# Patient Record
Sex: FEMALE | Race: ASIAN | NOT HISPANIC OR LATINO
[De-identification: names, ages, dates, MRNs, and addresses within clinical notes are randomized per-mention and may not be internally consistent; named-entity substitution may affect disease eponyms.]

---

## 2017-03-17 ENCOUNTER — APPOINTMENT (OUTPATIENT)
Dept: UROLOGY | Facility: CLINIC | Age: 67
End: 2017-03-17

## 2017-03-17 ENCOUNTER — OUTPATIENT (OUTPATIENT)
Dept: OUTPATIENT SERVICES | Facility: HOSPITAL | Age: 67
LOS: 1 days | End: 2017-03-17
Payer: MEDICAID

## 2017-03-17 DIAGNOSIS — E11.8 TYPE 2 DIABETES MELLITUS WITH UNSPECIFIED COMPLICATIONS: ICD-10-CM

## 2017-03-17 DIAGNOSIS — E11.9 TYPE 2 DIABETES MELLITUS W/OUT COMPLICATIONS: ICD-10-CM

## 2017-03-17 DIAGNOSIS — R35.0 FREQUENCY OF MICTURITION: ICD-10-CM

## 2017-03-17 DIAGNOSIS — Z98.89 OTHER SPECIFIED POSTPROCEDURAL STATES: Chronic | ICD-10-CM

## 2017-03-17 DIAGNOSIS — Z79.4 TYPE 2 DIABETES MELLITUS WITH UNSPECIFIED COMPLICATIONS: ICD-10-CM

## 2017-03-17 DIAGNOSIS — Z90.710 ACQUIRED ABSENCE OF BOTH CERVIX AND UTERUS: Chronic | ICD-10-CM

## 2017-03-17 LAB
BILIRUB UR QL STRIP: NORMAL
CLARITY UR: CLEAR
COLLECTION METHOD: NORMAL
GLUCOSE UR-MCNC: NORMAL
HCG UR QL: 0.2 EU/DL
HGB UR QL STRIP.AUTO: NORMAL
KETONES UR-MCNC: NORMAL
LEUKOCYTE ESTERASE UR QL STRIP: NORMAL
NITRITE UR QL STRIP: NORMAL
PH UR STRIP: 6
PROT UR STRIP-MCNC: NORMAL
SP GR UR STRIP: 1.01

## 2017-03-17 PROCEDURE — 51797 INTRAABDOMINAL PRESSURE TEST: CPT

## 2017-03-17 PROCEDURE — 51728 CYSTOMETROGRAM W/VP: CPT

## 2017-03-17 PROCEDURE — 51784 ANAL/URINARY MUSCLE STUDY: CPT

## 2017-03-17 PROCEDURE — 51741 ELECTRO-UROFLOWMETRY FIRST: CPT

## 2017-03-22 DIAGNOSIS — N31.9 NEUROMUSCULAR DYSFUNCTION OF BLADDER, UNSPECIFIED: ICD-10-CM

## 2017-03-22 DIAGNOSIS — N39.41 URGE INCONTINENCE: ICD-10-CM

## 2017-03-22 DIAGNOSIS — E11.9 TYPE 2 DIABETES MELLITUS WITHOUT COMPLICATIONS: ICD-10-CM

## 2017-03-22 DIAGNOSIS — E11.8 TYPE 2 DIABETES MELLITUS WITH UNSPECIFIED COMPLICATIONS: ICD-10-CM

## 2017-04-21 ENCOUNTER — APPOINTMENT (OUTPATIENT)
Dept: UROLOGY | Facility: CLINIC | Age: 67
End: 2017-04-21

## 2017-04-21 VITALS
DIASTOLIC BLOOD PRESSURE: 83 MMHG | TEMPERATURE: 98.2 F | RESPIRATION RATE: 16 BRPM | HEART RATE: 92 BPM | SYSTOLIC BLOOD PRESSURE: 117 MMHG

## 2017-05-15 ENCOUNTER — RX RENEWAL (OUTPATIENT)
Age: 67
End: 2017-05-15

## 2017-05-15 RX ORDER — OXYBUTYNIN CHLORIDE 5 MG/1
5 TABLET, EXTENDED RELEASE ORAL
Qty: 30 | Refills: 1 | Status: DISCONTINUED | COMMUNITY
Start: 2017-05-15 | End: 2017-05-15

## 2017-07-28 ENCOUNTER — APPOINTMENT (OUTPATIENT)
Dept: UROLOGY | Facility: CLINIC | Age: 67
End: 2017-07-28
Payer: MEDICAID

## 2017-07-28 VITALS
SYSTOLIC BLOOD PRESSURE: 99 MMHG | DIASTOLIC BLOOD PRESSURE: 65 MMHG | HEART RATE: 91 BPM | RESPIRATION RATE: 16 BRPM | TEMPERATURE: 98.4 F

## 2017-07-28 DIAGNOSIS — R33.9 RETENTION OF URINE, UNSPECIFIED: ICD-10-CM

## 2017-07-28 DIAGNOSIS — N31.9 NEUROMUSCULAR DYSFUNCTION OF BLADDER, UNSPECIFIED: ICD-10-CM

## 2017-07-28 PROCEDURE — 99213 OFFICE O/P EST LOW 20 MIN: CPT

## 2017-07-28 RX ORDER — ZOLPIDEM TARTRATE 10 MG/1
10 TABLET ORAL
Qty: 30 | Refills: 0 | Status: ACTIVE | COMMUNITY
Start: 2017-03-07

## 2017-07-28 RX ORDER — BLOOD SUGAR DIAGNOSTIC
STRIP MISCELLANEOUS
Qty: 100 | Refills: 0 | Status: ACTIVE | COMMUNITY
Start: 2017-04-21

## 2017-07-28 RX ORDER — NAPROXEN 500 MG/1
500 TABLET, DELAYED RELEASE ORAL
Qty: 60 | Refills: 0 | Status: ACTIVE | COMMUNITY
Start: 2017-03-06

## 2017-07-28 RX ORDER — LIRAGLUTIDE 6 MG/ML
18 INJECTION SUBCUTANEOUS
Qty: 9 | Refills: 0 | Status: ACTIVE | COMMUNITY
Start: 2017-03-13

## 2017-07-28 RX ORDER — PEN NEEDLE, DIABETIC 29 G X1/2"
32G X 4 MM NEEDLE, DISPOSABLE MISCELLANEOUS
Qty: 100 | Refills: 0 | Status: ACTIVE | COMMUNITY
Start: 2017-03-14

## 2017-08-15 ENCOUNTER — RX RENEWAL (OUTPATIENT)
Age: 67
End: 2017-08-15

## 2017-09-29 ENCOUNTER — APPOINTMENT (OUTPATIENT)
Dept: UROLOGY | Facility: CLINIC | Age: 67
End: 2017-09-29
Payer: MEDICAID

## 2017-09-29 VITALS — DIASTOLIC BLOOD PRESSURE: 71 MMHG | RESPIRATION RATE: 16 BRPM | SYSTOLIC BLOOD PRESSURE: 107 MMHG | HEART RATE: 83 BPM

## 2017-09-29 DIAGNOSIS — N39.0 URINARY TRACT INFECTION, SITE NOT SPECIFIED: ICD-10-CM

## 2017-09-29 DIAGNOSIS — N39.41 URGE INCONTINENCE: ICD-10-CM

## 2017-09-29 DIAGNOSIS — R10.2 PELVIC AND PERINEAL PAIN: ICD-10-CM

## 2017-09-29 PROCEDURE — 99213 OFFICE O/P EST LOW 20 MIN: CPT

## 2017-09-29 RX ORDER — AMOXICILLIN AND CLAVULANATE POTASSIUM 875; 125 MG/1; MG/1
875-125 TABLET, COATED ORAL TWICE DAILY
Qty: 20 | Refills: 0 | Status: ACTIVE | COMMUNITY
Start: 2017-09-29 | End: 1900-01-01

## 2017-10-09 ENCOUNTER — APPOINTMENT (OUTPATIENT)
Age: 67
End: 2017-10-09

## 2017-10-09 LAB
APPEARANCE: CLEAR
BACTERIA UR CULT: NORMAL
BILIRUBIN URINE: NEGATIVE
BLOOD URINE: NEGATIVE
COLOR: YELLOW
GLUCOSE QUALITATIVE U: NORMAL MG/DL
KETONES URINE: NEGATIVE
LEUKOCYTE ESTERASE URINE: NEGATIVE
NITRITE URINE: NEGATIVE
PH URINE: 6
PROTEIN URINE: NEGATIVE MG/DL
SPECIFIC GRAVITY URINE: 1.02
UROBILINOGEN URINE: NORMAL MG/DL

## 2017-12-08 ENCOUNTER — APPOINTMENT (OUTPATIENT)
Dept: UROLOGY | Facility: CLINIC | Age: 67
End: 2017-12-08

## 2017-12-19 ENCOUNTER — RX RENEWAL (OUTPATIENT)
Age: 67
End: 2017-12-19

## 2018-02-15 ENCOUNTER — RX RENEWAL (OUTPATIENT)
Age: 68
End: 2018-02-15

## 2018-02-15 RX ORDER — OXYBUTYNIN CHLORIDE 10 MG/1
10 TABLET, EXTENDED RELEASE ORAL
Qty: 30 | Refills: 2 | Status: ACTIVE | COMMUNITY
Start: 2017-03-17 | End: 1900-01-01

## 2018-06-01 ENCOUNTER — OUTPATIENT (OUTPATIENT)
Dept: OUTPATIENT SERVICES | Facility: HOSPITAL | Age: 68
LOS: 1 days | End: 2018-06-01
Payer: MEDICAID

## 2018-06-01 DIAGNOSIS — Z90.710 ACQUIRED ABSENCE OF BOTH CERVIX AND UTERUS: Chronic | ICD-10-CM

## 2018-06-01 DIAGNOSIS — Z98.89 OTHER SPECIFIED POSTPROCEDURAL STATES: Chronic | ICD-10-CM

## 2018-06-01 PROCEDURE — G9001: CPT

## 2018-06-24 ENCOUNTER — INPATIENT (INPATIENT)
Facility: HOSPITAL | Age: 68
LOS: 3 days | End: 2018-06-28
Attending: STUDENT IN AN ORGANIZED HEALTH CARE EDUCATION/TRAINING PROGRAM | Admitting: STUDENT IN AN ORGANIZED HEALTH CARE EDUCATION/TRAINING PROGRAM
Payer: SUBSIDIZED

## 2018-06-24 VITALS
SYSTOLIC BLOOD PRESSURE: 160 MMHG | HEART RATE: 111 BPM | OXYGEN SATURATION: 100 % | DIASTOLIC BLOOD PRESSURE: 101 MMHG | TEMPERATURE: 102 F | RESPIRATION RATE: 20 BRPM

## 2018-06-24 DIAGNOSIS — Z90.710 ACQUIRED ABSENCE OF BOTH CERVIX AND UTERUS: Chronic | ICD-10-CM

## 2018-06-24 DIAGNOSIS — Z98.89 OTHER SPECIFIED POSTPROCEDURAL STATES: Chronic | ICD-10-CM

## 2018-06-24 DIAGNOSIS — A41.9 SEPSIS, UNSPECIFIED ORGANISM: ICD-10-CM

## 2018-06-24 LAB
APPEARANCE UR: CLEAR — SIGNIFICANT CHANGE UP
BACTERIA # UR AUTO: SIGNIFICANT CHANGE UP
BILIRUB UR-MCNC: NEGATIVE — SIGNIFICANT CHANGE UP
BLOOD UR QL VISUAL: HIGH
COLOR SPEC: SIGNIFICANT CHANGE UP
GLUCOSE UR-MCNC: 250 — SIGNIFICANT CHANGE UP
KETONES UR-MCNC: NEGATIVE — SIGNIFICANT CHANGE UP
LEUKOCYTE ESTERASE UR-ACNC: NEGATIVE — SIGNIFICANT CHANGE UP
MUCOUS THREADS # UR AUTO: SIGNIFICANT CHANGE UP
NITRITE UR-MCNC: NEGATIVE — SIGNIFICANT CHANGE UP
PH UR: 7 — SIGNIFICANT CHANGE UP (ref 4.6–8)
PROT UR-MCNC: 150 MG/DL — HIGH
RBC CASTS # UR COMP ASSIST: SIGNIFICANT CHANGE UP (ref 0–?)
SP GR SPEC: 1.01 — SIGNIFICANT CHANGE UP (ref 1–1.04)
SQUAMOUS # UR AUTO: SIGNIFICANT CHANGE UP
UROBILINOGEN FLD QL: NORMAL MG/DL — SIGNIFICANT CHANGE UP
WBC UR QL: SIGNIFICANT CHANGE UP (ref 0–?)

## 2018-06-24 PROCEDURE — 71275 CT ANGIOGRAPHY CHEST: CPT | Mod: 26

## 2018-06-24 PROCEDURE — 70450 CT HEAD/BRAIN W/O DYE: CPT | Mod: 26

## 2018-06-24 PROCEDURE — 71045 X-RAY EXAM CHEST 1 VIEW: CPT | Mod: 26

## 2018-06-24 RX ORDER — PIPERACILLIN AND TAZOBACTAM 4; .5 G/20ML; G/20ML
3.38 INJECTION, POWDER, LYOPHILIZED, FOR SOLUTION INTRAVENOUS ONCE
Qty: 0 | Refills: 0 | Status: COMPLETED | OUTPATIENT
Start: 2018-06-24 | End: 2018-06-24

## 2018-06-24 RX ORDER — VANCOMYCIN HCL 1 G
1000 VIAL (EA) INTRAVENOUS ONCE
Qty: 0 | Refills: 0 | Status: COMPLETED | OUTPATIENT
Start: 2018-06-24 | End: 2018-06-25

## 2018-06-24 RX ORDER — SODIUM CHLORIDE 9 MG/ML
1000 INJECTION INTRAMUSCULAR; INTRAVENOUS; SUBCUTANEOUS ONCE
Qty: 0 | Refills: 0 | Status: COMPLETED | OUTPATIENT
Start: 2018-06-24 | End: 2018-06-24

## 2018-06-24 RX ORDER — ACETAMINOPHEN 500 MG
1000 TABLET ORAL ONCE
Qty: 0 | Refills: 0 | Status: COMPLETED | OUTPATIENT
Start: 2018-06-24 | End: 2018-06-24

## 2018-06-24 RX ADMIN — SODIUM CHLORIDE 1000 MILLILITER(S): 9 INJECTION INTRAMUSCULAR; INTRAVENOUS; SUBCUTANEOUS at 22:52

## 2018-06-24 RX ADMIN — PIPERACILLIN AND TAZOBACTAM 200 GRAM(S): 4; .5 INJECTION, POWDER, LYOPHILIZED, FOR SOLUTION INTRAVENOUS at 22:15

## 2018-06-24 RX ADMIN — Medication 400 MILLIGRAM(S): at 22:15

## 2018-06-24 NOTE — ED PROVIDER NOTE - OBJECTIVE STATEMENT
67F with hx of DM2 p/w witnessed arrest. Patient BIBEMS after ROSC obtained. Patient was noted to just stop breathing at home, EMS called and arrived 3-4 mins later found to be in PEA and CPR initiated. Received epi x1 and rosc obtained. Patient intubated at scene and brought to ED. Family provides more hx that she has had a URI this week and started cipro for presumed UTI.

## 2018-06-24 NOTE — ED ADULT TRIAGE NOTE - CHIEF COMPLAINT QUOTE
pt. arrived as a Notification to [facility]. Pt. arrives as a post cardiac arrest , as per EMS pt. was intubated at the scene and bp 70/50.

## 2018-06-24 NOTE — ED PROVIDER NOTE - MEDICAL DECISION MAKING DETAILS
67F with witnessed cardiac arrest s/p rosc, intubated, remains unresponsive. febrile, sepsis w/u, abs, ivf, tylenol. MICU eval

## 2018-06-24 NOTE — ED PROVIDER NOTE - PMH
DM (diabetes mellitus)    HTN (hypertension)    Morbid obesity, unspecified obesity type    Uterine prolapse

## 2018-06-24 NOTE — ED PROVIDER NOTE - ATTENDING CONTRIBUTION TO CARE
Attending note:   After face to face evaluation of this patient, I concur with above noted hx, pe, and care plan for this patient. 66 y/o F, morbid obesity, recent uti, on cipro here after full cardiac arrest at home with cpr by family and by ems with intubation, cpr and epi by ems with return of spontaneous respiration.   In ED, bp 180/systolic with pox 100 with ventilation.    T102; sepsis work-up in progress

## 2018-06-24 NOTE — ED ADULT NURSE NOTE - OBJECTIVE STATEMENT
Ailyn RN: Patient arrives to ED as post cardiac arrest from home, intubated ett # 8 , 25 at [facility].  Ems states cpr was initiated immediately after patient was found and 1 round of epi received at 2055. Rosc achieved at 2058 Patient has IO in place from EMS to right Tibia infusing with NS. Patient found to be febrile in exam room and sepsis protocol intiated at 2200, blood work sent and IV fluids/ meds initiated. Family reports patient recently being treated for uti. Family unsure of medical hx, patient is morbidly obese and self catheterizes at home. v/s stable currently. 16 fr monroy placed with 200ml output initially. Urine appears clear, and yellow. Will continue to monitor, currently on zole and ED cardiac monitor. Vent settingsrr-16, volume 500, O2 100%, PEEP 5.

## 2018-06-25 LAB
ALBUMIN SERPL ELPH-MCNC: 3.2 G/DL — LOW (ref 3.3–5)
ALBUMIN SERPL ELPH-MCNC: 3.3 G/DL — SIGNIFICANT CHANGE UP (ref 3.3–5)
ALP SERPL-CCNC: 229 U/L — HIGH (ref 40–120)
ALP SERPL-CCNC: 245 U/L — HIGH (ref 40–120)
ALT FLD-CCNC: 44 U/L — HIGH (ref 4–33)
ALT FLD-CCNC: 44 U/L — HIGH (ref 4–33)
ANISOCYTOSIS BLD QL: SIGNIFICANT CHANGE UP
AST SERPL-CCNC: 55 U/L — HIGH (ref 4–32)
AST SERPL-CCNC: 67 U/L — HIGH (ref 4–32)
B PERT DNA SPEC QL NAA+PROBE: SIGNIFICANT CHANGE UP
BASE EXCESS BLDA CALC-SCNC: -0.7 MMOL/L — SIGNIFICANT CHANGE UP
BASE EXCESS BLDA CALC-SCNC: -4.2 MMOL/L — SIGNIFICANT CHANGE UP
BASO STIPL BLD QL SMEAR: PRESENT — SIGNIFICANT CHANGE UP
BASOPHILS # BLD AUTO: 0.01 K/UL — SIGNIFICANT CHANGE UP (ref 0–0.2)
BASOPHILS # BLD AUTO: 0.03 K/UL — SIGNIFICANT CHANGE UP (ref 0–0.2)
BASOPHILS NFR BLD AUTO: 0.1 % — SIGNIFICANT CHANGE UP (ref 0–2)
BASOPHILS NFR BLD AUTO: 0.1 % — SIGNIFICANT CHANGE UP (ref 0–2)
BASOPHILS NFR SPEC: 0 % — SIGNIFICANT CHANGE UP (ref 0–2)
BILIRUB SERPL-MCNC: 0.5 MG/DL — SIGNIFICANT CHANGE UP (ref 0.2–1.2)
BILIRUB SERPL-MCNC: 0.7 MG/DL — SIGNIFICANT CHANGE UP (ref 0.2–1.2)
BUN SERPL-MCNC: 24 MG/DL — HIGH (ref 7–23)
BUN SERPL-MCNC: 25 MG/DL — HIGH (ref 7–23)
BURR CELLS BLD QL SMEAR: PRESENT — SIGNIFICANT CHANGE UP
C PNEUM DNA SPEC QL NAA+PROBE: NOT DETECTED — SIGNIFICANT CHANGE UP
CALCIUM SERPL-MCNC: 7.6 MG/DL — LOW (ref 8.4–10.5)
CALCIUM SERPL-MCNC: 8.2 MG/DL — LOW (ref 8.4–10.5)
CHLORIDE BLDA-SCNC: 106 MMOL/L — SIGNIFICANT CHANGE UP (ref 96–108)
CHLORIDE SERPL-SCNC: 101 MMOL/L — SIGNIFICANT CHANGE UP (ref 98–107)
CHLORIDE SERPL-SCNC: 99 MMOL/L — SIGNIFICANT CHANGE UP (ref 98–107)
CK MB BLD-MCNC: 2 — SIGNIFICANT CHANGE UP (ref 0–2.5)
CK MB BLD-MCNC: 2.2 — SIGNIFICANT CHANGE UP (ref 0–2.5)
CK MB BLD-MCNC: 3.4 NG/ML — SIGNIFICANT CHANGE UP (ref 1–4.7)
CK MB BLD-MCNC: 3.74 NG/ML — SIGNIFICANT CHANGE UP (ref 1–4.7)
CK SERPL-CCNC: 167 U/L — SIGNIFICANT CHANGE UP (ref 25–170)
CK SERPL-CCNC: 174 U/L — HIGH (ref 25–170)
CO2 SERPL-SCNC: 21 MMOL/L — LOW (ref 22–31)
CO2 SERPL-SCNC: 21 MMOL/L — LOW (ref 22–31)
CREAT SERPL-MCNC: 1.32 MG/DL — HIGH (ref 0.5–1.3)
CREAT SERPL-MCNC: 1.38 MG/DL — HIGH (ref 0.5–1.3)
EOSINOPHIL # BLD AUTO: 0 K/UL — SIGNIFICANT CHANGE UP (ref 0–0.5)
EOSINOPHIL # BLD AUTO: 0.01 K/UL — SIGNIFICANT CHANGE UP (ref 0–0.5)
EOSINOPHIL NFR BLD AUTO: 0 % — SIGNIFICANT CHANGE UP (ref 0–6)
EOSINOPHIL NFR BLD AUTO: 0 % — SIGNIFICANT CHANGE UP (ref 0–6)
EOSINOPHIL NFR FLD: 0 % — SIGNIFICANT CHANGE UP (ref 0–6)
FLUAV H1 2009 PAND RNA SPEC QL NAA+PROBE: NOT DETECTED — SIGNIFICANT CHANGE UP
FLUAV H1 RNA SPEC QL NAA+PROBE: NOT DETECTED — SIGNIFICANT CHANGE UP
FLUAV H3 RNA SPEC QL NAA+PROBE: NOT DETECTED — SIGNIFICANT CHANGE UP
FLUAV SUBTYP SPEC NAA+PROBE: SIGNIFICANT CHANGE UP
FLUBV RNA SPEC QL NAA+PROBE: NOT DETECTED — SIGNIFICANT CHANGE UP
GIANT PLATELETS BLD QL SMEAR: PRESENT — SIGNIFICANT CHANGE UP
GLUCOSE BLDA-MCNC: 262 MG/DL — HIGH (ref 70–99)
GLUCOSE BLDC GLUCOMTR-MCNC: 156 MG/DL — HIGH (ref 70–99)
GLUCOSE BLDC GLUCOMTR-MCNC: 156 MG/DL — HIGH (ref 70–99)
GLUCOSE BLDC GLUCOMTR-MCNC: 180 MG/DL — HIGH (ref 70–99)
GLUCOSE BLDC GLUCOMTR-MCNC: 216 MG/DL — HIGH (ref 70–99)
GLUCOSE SERPL-MCNC: 224 MG/DL — HIGH (ref 70–99)
GLUCOSE SERPL-MCNC: 252 MG/DL — HIGH (ref 70–99)
GRAM STN SPT: SIGNIFICANT CHANGE UP
HADV DNA SPEC QL NAA+PROBE: NOT DETECTED — SIGNIFICANT CHANGE UP
HBA1C BLD-MCNC: 6 % — HIGH (ref 4–5.6)
HCO3 BLDA-SCNC: 21 MMOL/L — LOW (ref 22–26)
HCO3 BLDA-SCNC: 23 MMOL/L — SIGNIFICANT CHANGE UP (ref 22–26)
HCOV 229E RNA SPEC QL NAA+PROBE: NOT DETECTED — SIGNIFICANT CHANGE UP
HCOV HKU1 RNA SPEC QL NAA+PROBE: NOT DETECTED — SIGNIFICANT CHANGE UP
HCOV NL63 RNA SPEC QL NAA+PROBE: NOT DETECTED — SIGNIFICANT CHANGE UP
HCOV OC43 RNA SPEC QL NAA+PROBE: NOT DETECTED — SIGNIFICANT CHANGE UP
HCT VFR BLD CALC: 34.3 % — LOW (ref 34.5–45)
HCT VFR BLD CALC: 35 % — SIGNIFICANT CHANGE UP (ref 34.5–45)
HCT VFR BLDA CALC: 30.4 % — LOW (ref 34.5–46.5)
HGB BLD-MCNC: 10 G/DL — LOW (ref 11.5–15.5)
HGB BLD-MCNC: 9.6 G/DL — LOW (ref 11.5–15.5)
HGB BLDA-MCNC: 9.8 G/DL — LOW (ref 11.5–15.5)
HMPV RNA SPEC QL NAA+PROBE: NOT DETECTED — SIGNIFICANT CHANGE UP
HPIV1 RNA SPEC QL NAA+PROBE: NOT DETECTED — SIGNIFICANT CHANGE UP
HPIV2 RNA SPEC QL NAA+PROBE: NOT DETECTED — SIGNIFICANT CHANGE UP
HPIV3 RNA SPEC QL NAA+PROBE: NOT DETECTED — SIGNIFICANT CHANGE UP
HPIV4 RNA SPEC QL NAA+PROBE: NOT DETECTED — SIGNIFICANT CHANGE UP
IMM GRANULOCYTES # BLD AUTO: 0.13 # — SIGNIFICANT CHANGE UP
IMM GRANULOCYTES # BLD AUTO: 0.19 # — SIGNIFICANT CHANGE UP
IMM GRANULOCYTES NFR BLD AUTO: 0.7 % — SIGNIFICANT CHANGE UP (ref 0–1.5)
IMM GRANULOCYTES NFR BLD AUTO: 0.8 % — SIGNIFICANT CHANGE UP (ref 0–1.5)
LACTATE BLDA-SCNC: 1.6 MMOL/L — SIGNIFICANT CHANGE UP (ref 0.5–2)
LYMPHOCYTES # BLD AUTO: 0.51 K/UL — LOW (ref 1–3.3)
LYMPHOCYTES # BLD AUTO: 0.69 K/UL — LOW (ref 1–3.3)
LYMPHOCYTES # BLD AUTO: 2.2 % — LOW (ref 13–44)
LYMPHOCYTES # BLD AUTO: 3.5 % — LOW (ref 13–44)
LYMPHOCYTES NFR SPEC AUTO: 0.9 % — LOW (ref 13–44)
M PNEUMO DNA SPEC QL NAA+PROBE: NOT DETECTED — SIGNIFICANT CHANGE UP
MAGNESIUM SERPL-MCNC: 2.3 MG/DL — SIGNIFICANT CHANGE UP (ref 1.6–2.6)
MAGNESIUM SERPL-MCNC: 2.4 MG/DL — SIGNIFICANT CHANGE UP (ref 1.6–2.6)
MCHC RBC-ENTMCNC: 17.6 PG — LOW (ref 27–34)
MCHC RBC-ENTMCNC: 17.7 PG — LOW (ref 27–34)
MCHC RBC-ENTMCNC: 27.4 % — LOW (ref 32–36)
MCHC RBC-ENTMCNC: 29.2 % — LOW (ref 32–36)
MCV RBC AUTO: 60.4 FL — LOW (ref 80–100)
MCV RBC AUTO: 64.6 FL — LOW (ref 80–100)
METHOD TYPE: SIGNIFICANT CHANGE UP
MICROCYTES BLD QL: SIGNIFICANT CHANGE UP
MONOCYTES # BLD AUTO: 0.82 K/UL — SIGNIFICANT CHANGE UP (ref 0–0.9)
MONOCYTES # BLD AUTO: 1.95 K/UL — HIGH (ref 0–0.9)
MONOCYTES NFR BLD AUTO: 4.2 % — SIGNIFICANT CHANGE UP (ref 2–14)
MONOCYTES NFR BLD AUTO: 8.5 % — SIGNIFICANT CHANGE UP (ref 2–14)
MONOCYTES NFR BLD: 4.4 % — SIGNIFICANT CHANGE UP (ref 2–9)
NEUTROPHIL AB SER-ACNC: 89.4 % — HIGH (ref 43–77)
NEUTROPHILS # BLD AUTO: 18 K/UL — HIGH (ref 1.8–7.4)
NEUTROPHILS # BLD AUTO: 20.16 K/UL — HIGH (ref 1.8–7.4)
NEUTROPHILS NFR BLD AUTO: 88.4 % — HIGH (ref 43–77)
NEUTROPHILS NFR BLD AUTO: 91.5 % — HIGH (ref 43–77)
NEUTS BAND # BLD: 5.3 % — SIGNIFICANT CHANGE UP (ref 0–6)
NRBC # FLD: 0 — SIGNIFICANT CHANGE UP
NRBC # FLD: 0 — SIGNIFICANT CHANGE UP
ORGANISM # SPEC MICROSCOPIC CNT: SIGNIFICANT CHANGE UP
ORGANISM # SPEC MICROSCOPIC CNT: SIGNIFICANT CHANGE UP
OVALOCYTES BLD QL SMEAR: SLIGHT — SIGNIFICANT CHANGE UP
PCO2 BLDA: 47 MMHG — SIGNIFICANT CHANGE UP (ref 32–48)
PCO2 BLDA: 49 MMHG — HIGH (ref 32–48)
PH BLDA: 7.28 PH — LOW (ref 7.35–7.45)
PH BLDA: 7.32 PH — LOW (ref 7.35–7.45)
PHOSPHATE SERPL-MCNC: 2.1 MG/DL — LOW (ref 2.5–4.5)
PHOSPHATE SERPL-MCNC: 3.3 MG/DL — SIGNIFICANT CHANGE UP (ref 2.5–4.5)
PLATELET # BLD AUTO: 257 K/UL — SIGNIFICANT CHANGE UP (ref 150–400)
PLATELET # BLD AUTO: 266 K/UL — SIGNIFICANT CHANGE UP (ref 150–400)
PLATELET COUNT - ESTIMATE: NORMAL — SIGNIFICANT CHANGE UP
PMV BLD: SIGNIFICANT CHANGE UP FL (ref 7–13)
PMV BLD: SIGNIFICANT CHANGE UP FL (ref 7–13)
PO2 BLDA: 109 MMHG — HIGH (ref 83–108)
PO2 BLDA: 38 MMHG — CRITICAL LOW (ref 83–108)
POIKILOCYTOSIS BLD QL AUTO: SIGNIFICANT CHANGE UP
POLYCHROMASIA BLD QL SMEAR: SLIGHT — SIGNIFICANT CHANGE UP
POTASSIUM BLDA-SCNC: 4.4 MMOL/L — SIGNIFICANT CHANGE UP (ref 3.4–4.5)
POTASSIUM SERPL-MCNC: 4.6 MMOL/L — SIGNIFICANT CHANGE UP (ref 3.5–5.3)
POTASSIUM SERPL-MCNC: 4.8 MMOL/L — SIGNIFICANT CHANGE UP (ref 3.5–5.3)
POTASSIUM SERPL-SCNC: 4.6 MMOL/L — SIGNIFICANT CHANGE UP (ref 3.5–5.3)
POTASSIUM SERPL-SCNC: 4.8 MMOL/L — SIGNIFICANT CHANGE UP (ref 3.5–5.3)
PROT SERPL-MCNC: 6.3 G/DL — SIGNIFICANT CHANGE UP (ref 6–8.3)
PROT SERPL-MCNC: 6.6 G/DL — SIGNIFICANT CHANGE UP (ref 6–8.3)
RBC # BLD: 5.42 M/UL — HIGH (ref 3.8–5.2)
RBC # BLD: 5.68 M/UL — HIGH (ref 3.8–5.2)
RBC # FLD: 23.2 % — HIGH (ref 10.3–14.5)
RBC # FLD: 23.5 % — HIGH (ref 10.3–14.5)
RSV RNA SPEC QL NAA+PROBE: NOT DETECTED — SIGNIFICANT CHANGE UP
RV+EV RNA SPEC QL NAA+PROBE: NOT DETECTED — SIGNIFICANT CHANGE UP
SAO2 % BLDA: 70.7 % — LOW (ref 95–99)
SAO2 % BLDA: 96.8 % — SIGNIFICANT CHANGE UP (ref 95–99)
SCHISTOCYTES BLD QL AUTO: SLIGHT — SIGNIFICANT CHANGE UP
SODIUM BLDA-SCNC: 134 MMOL/L — LOW (ref 136–146)
SODIUM SERPL-SCNC: 135 MMOL/L — SIGNIFICANT CHANGE UP (ref 135–145)
SODIUM SERPL-SCNC: 136 MMOL/L — SIGNIFICANT CHANGE UP (ref 135–145)
SPECIMEN SOURCE: SIGNIFICANT CHANGE UP
T3 SERPL-MCNC: 73.9 NG/DL — LOW (ref 80–200)
T4 AB SER-ACNC: 7.76 UG/DL — SIGNIFICANT CHANGE UP (ref 5.1–13)
TARGETS BLD QL SMEAR: SLIGHT — SIGNIFICANT CHANGE UP
TROPONIN T, HIGH SENSITIVITY: 129 NG/L — CRITICAL HIGH (ref ?–14)
TROPONIN T, HIGH SENSITIVITY: 173 NG/L — CRITICAL HIGH (ref ?–14)
TROPONIN T, HIGH SENSITIVITY: 176 NG/L — CRITICAL HIGH (ref ?–14)
TSH SERPL-MCNC: 0.46 UIU/ML — SIGNIFICANT CHANGE UP (ref 0.27–4.2)
WBC # BLD: 19.65 K/UL — HIGH (ref 3.8–10.5)
WBC # BLD: 22.85 K/UL — HIGH (ref 3.8–10.5)
WBC # FLD AUTO: 19.65 K/UL — HIGH (ref 3.8–10.5)
WBC # FLD AUTO: 22.85 K/UL — HIGH (ref 3.8–10.5)

## 2018-06-25 PROCEDURE — 95819 EEG AWAKE AND ASLEEP: CPT | Mod: 26

## 2018-06-25 PROCEDURE — 71045 X-RAY EXAM CHEST 1 VIEW: CPT | Mod: 26

## 2018-06-25 PROCEDURE — 99291 CRITICAL CARE FIRST HOUR: CPT | Mod: 25

## 2018-06-25 PROCEDURE — 93308 TTE F-UP OR LMTD: CPT | Mod: 26

## 2018-06-25 PROCEDURE — 76604 US EXAM CHEST: CPT | Mod: 26

## 2018-06-25 RX ORDER — AZITHROMYCIN 500 MG/1
500 TABLET, FILM COATED ORAL EVERY 24 HOURS
Qty: 0 | Refills: 0 | Status: DISCONTINUED | OUTPATIENT
Start: 2018-06-26 | End: 2018-06-28

## 2018-06-25 RX ORDER — DEXTROSE 50 % IN WATER 50 %
25 SYRINGE (ML) INTRAVENOUS ONCE
Qty: 0 | Refills: 0 | Status: DISCONTINUED | OUTPATIENT
Start: 2018-06-25 | End: 2018-06-28

## 2018-06-25 RX ORDER — ATORVASTATIN CALCIUM 80 MG/1
1 TABLET, FILM COATED ORAL
Qty: 0 | Refills: 0 | COMMUNITY

## 2018-06-25 RX ORDER — ALBUTEROL 90 UG/1
2 AEROSOL, METERED ORAL EVERY 6 HOURS
Qty: 0 | Refills: 0 | Status: DISCONTINUED | OUTPATIENT
Start: 2018-06-25 | End: 2018-06-27

## 2018-06-25 RX ORDER — MIDAZOLAM HYDROCHLORIDE 1 MG/ML
0.02 INJECTION, SOLUTION INTRAMUSCULAR; INTRAVENOUS
Qty: 100 | Refills: 0 | Status: DISCONTINUED | OUTPATIENT
Start: 2018-06-25 | End: 2018-06-25

## 2018-06-25 RX ORDER — CHOLECALCIFEROL (VITAMIN D3) 125 MCG
1000 CAPSULE ORAL DAILY
Qty: 0 | Refills: 0 | Status: DISCONTINUED | OUTPATIENT
Start: 2018-06-25 | End: 2018-06-27

## 2018-06-25 RX ORDER — NOREPINEPHRINE BITARTRATE/D5W 8 MG/250ML
0.05 PLASTIC BAG, INJECTION (ML) INTRAVENOUS
Qty: 8 | Refills: 0 | Status: DISCONTINUED | OUTPATIENT
Start: 2018-06-25 | End: 2018-06-25

## 2018-06-25 RX ORDER — GLUCAGON INJECTION, SOLUTION 0.5 MG/.1ML
1 INJECTION, SOLUTION SUBCUTANEOUS ONCE
Qty: 0 | Refills: 0 | Status: DISCONTINUED | OUTPATIENT
Start: 2018-06-25 | End: 2018-06-28

## 2018-06-25 RX ORDER — IPRATROPIUM/ALBUTEROL SULFATE 18-103MCG
3 AEROSOL WITH ADAPTER (GRAM) INHALATION EVERY 6 HOURS
Qty: 0 | Refills: 0 | Status: DISCONTINUED | OUTPATIENT
Start: 2018-06-25 | End: 2018-06-25

## 2018-06-25 RX ORDER — INSULIN LISPRO 100/ML
VIAL (ML) SUBCUTANEOUS AT BEDTIME
Qty: 0 | Refills: 0 | Status: DISCONTINUED | OUTPATIENT
Start: 2018-06-25 | End: 2018-06-25

## 2018-06-25 RX ORDER — INSULIN LISPRO 100/ML
VIAL (ML) SUBCUTANEOUS EVERY 6 HOURS
Qty: 0 | Refills: 0 | Status: DISCONTINUED | OUTPATIENT
Start: 2018-06-25 | End: 2018-06-28

## 2018-06-25 RX ORDER — OXYBUTYNIN CHLORIDE 5 MG
1 TABLET ORAL
Qty: 0 | Refills: 0 | COMMUNITY

## 2018-06-25 RX ORDER — ATORVASTATIN CALCIUM 80 MG/1
20 TABLET, FILM COATED ORAL AT BEDTIME
Qty: 0 | Refills: 0 | Status: DISCONTINUED | OUTPATIENT
Start: 2018-06-25 | End: 2018-06-27

## 2018-06-25 RX ORDER — ACETAMINOPHEN 500 MG
650 TABLET ORAL EVERY 6 HOURS
Qty: 0 | Refills: 0 | Status: DISCONTINUED | OUTPATIENT
Start: 2018-06-25 | End: 2018-06-27

## 2018-06-25 RX ORDER — PIPERACILLIN AND TAZOBACTAM 4; .5 G/20ML; G/20ML
3.38 INJECTION, POWDER, LYOPHILIZED, FOR SOLUTION INTRAVENOUS EVERY 8 HOURS
Qty: 0 | Refills: 0 | Status: DISCONTINUED | OUTPATIENT
Start: 2018-06-25 | End: 2018-06-26

## 2018-06-25 RX ORDER — INSULIN LISPRO 100/ML
VIAL (ML) SUBCUTANEOUS
Qty: 0 | Refills: 0 | Status: DISCONTINUED | OUTPATIENT
Start: 2018-06-25 | End: 2018-06-25

## 2018-06-25 RX ORDER — AZITHROMYCIN 500 MG/1
500 TABLET, FILM COATED ORAL ONCE
Qty: 0 | Refills: 0 | Status: COMPLETED | OUTPATIENT
Start: 2018-06-25 | End: 2018-06-25

## 2018-06-25 RX ORDER — PROPOFOL 10 MG/ML
50 INJECTION, EMULSION INTRAVENOUS
Qty: 1000 | Refills: 0 | Status: DISCONTINUED | OUTPATIENT
Start: 2018-06-25 | End: 2018-06-25

## 2018-06-25 RX ORDER — ACETAMINOPHEN 500 MG
650 TABLET ORAL ONCE
Qty: 0 | Refills: 0 | Status: COMPLETED | OUTPATIENT
Start: 2018-06-25 | End: 2018-06-25

## 2018-06-25 RX ORDER — ATORVASTATIN CALCIUM 80 MG/1
10 TABLET, FILM COATED ORAL AT BEDTIME
Qty: 0 | Refills: 0 | Status: DISCONTINUED | OUTPATIENT
Start: 2018-06-25 | End: 2018-06-25

## 2018-06-25 RX ORDER — LIRAGLUTIDE 6 MG/ML
3 INJECTION SUBCUTANEOUS
Qty: 0 | Refills: 0 | COMMUNITY

## 2018-06-25 RX ORDER — DEXTROSE 50 % IN WATER 50 %
15 SYRINGE (ML) INTRAVENOUS ONCE
Qty: 0 | Refills: 0 | Status: DISCONTINUED | OUTPATIENT
Start: 2018-06-25 | End: 2018-06-28

## 2018-06-25 RX ORDER — SODIUM CHLORIDE 9 MG/ML
1000 INJECTION, SOLUTION INTRAVENOUS
Qty: 0 | Refills: 0 | Status: DISCONTINUED | OUTPATIENT
Start: 2018-06-25 | End: 2018-06-28

## 2018-06-25 RX ORDER — AZITHROMYCIN 500 MG/1
TABLET, FILM COATED ORAL
Qty: 0 | Refills: 0 | Status: DISCONTINUED | OUTPATIENT
Start: 2018-06-25 | End: 2018-06-28

## 2018-06-25 RX ORDER — DULOXETINE HYDROCHLORIDE 30 MG/1
30 CAPSULE, DELAYED RELEASE ORAL DAILY
Qty: 0 | Refills: 0 | Status: DISCONTINUED | OUTPATIENT
Start: 2018-06-25 | End: 2018-06-27

## 2018-06-25 RX ORDER — LOPERAMIDE HCL 2 MG
1 TABLET ORAL
Qty: 0 | Refills: 0 | COMMUNITY

## 2018-06-25 RX ORDER — INSULIN LISPRO 100/ML
VIAL (ML) SUBCUTANEOUS EVERY 6 HOURS
Qty: 0 | Refills: 0 | Status: DISCONTINUED | OUTPATIENT
Start: 2018-06-25 | End: 2018-06-25

## 2018-06-25 RX ORDER — DEXTROSE 50 % IN WATER 50 %
12.5 SYRINGE (ML) INTRAVENOUS ONCE
Qty: 0 | Refills: 0 | Status: DISCONTINUED | OUTPATIENT
Start: 2018-06-25 | End: 2018-06-28

## 2018-06-25 RX ORDER — ALTEPLASE 100 MG
2 KIT INTRAVENOUS ONCE
Qty: 0 | Refills: 0 | Status: DISCONTINUED | OUTPATIENT
Start: 2018-06-25 | End: 2018-06-25

## 2018-06-25 RX ORDER — LIRAGLUTIDE 6 MG/ML
0 INJECTION SUBCUTANEOUS
Qty: 0 | Refills: 0 | COMMUNITY

## 2018-06-25 RX ORDER — AMLODIPINE BESYLATE 2.5 MG/1
5 TABLET ORAL DAILY
Qty: 0 | Refills: 0 | Status: DISCONTINUED | OUTPATIENT
Start: 2018-06-25 | End: 2018-06-26

## 2018-06-25 RX ORDER — ALBUTEROL 90 UG/1
4 AEROSOL, METERED ORAL EVERY 6 HOURS
Qty: 0 | Refills: 0 | Status: DISCONTINUED | OUTPATIENT
Start: 2018-06-25 | End: 2018-06-25

## 2018-06-25 RX ORDER — HEPARIN SODIUM 5000 [USP'U]/ML
5000 INJECTION INTRAVENOUS; SUBCUTANEOUS EVERY 8 HOURS
Qty: 0 | Refills: 0 | Status: DISCONTINUED | OUTPATIENT
Start: 2018-06-25 | End: 2018-06-27

## 2018-06-25 RX ADMIN — Medication 2: at 17:22

## 2018-06-25 RX ADMIN — ALBUTEROL 2 PUFF(S): 90 AEROSOL, METERED ORAL at 15:39

## 2018-06-25 RX ADMIN — ALBUTEROL 2 PUFF(S): 90 AEROSOL, METERED ORAL at 03:55

## 2018-06-25 RX ADMIN — Medication 12.05 MICROGRAM(S)/KG/MIN: at 07:50

## 2018-06-25 RX ADMIN — Medication 1000 UNIT(S): at 11:21

## 2018-06-25 RX ADMIN — Medication 250 MILLIGRAM(S): at 01:15

## 2018-06-25 RX ADMIN — PIPERACILLIN AND TAZOBACTAM 25 GRAM(S): 4; .5 INJECTION, POWDER, LYOPHILIZED, FOR SOLUTION INTRAVENOUS at 22:06

## 2018-06-25 RX ADMIN — PIPERACILLIN AND TAZOBACTAM 25 GRAM(S): 4; .5 INJECTION, POWDER, LYOPHILIZED, FOR SOLUTION INTRAVENOUS at 13:55

## 2018-06-25 RX ADMIN — Medication 12.05 MICROGRAM(S)/KG/MIN: at 01:16

## 2018-06-25 RX ADMIN — PIPERACILLIN AND TAZOBACTAM 25 GRAM(S): 4; .5 INJECTION, POWDER, LYOPHILIZED, FOR SOLUTION INTRAVENOUS at 05:32

## 2018-06-25 RX ADMIN — Medication 650 MILLIGRAM(S): at 17:07

## 2018-06-25 RX ADMIN — DULOXETINE HYDROCHLORIDE 30 MILLIGRAM(S): 30 CAPSULE, DELAYED RELEASE ORAL at 11:21

## 2018-06-25 RX ADMIN — Medication 650 MILLIGRAM(S): at 09:35

## 2018-06-25 RX ADMIN — PROPOFOL 38.55 MICROGRAM(S)/KG/MIN: 10 INJECTION, EMULSION INTRAVENOUS at 02:00

## 2018-06-25 RX ADMIN — AMLODIPINE BESYLATE 5 MILLIGRAM(S): 2.5 TABLET ORAL at 22:06

## 2018-06-25 RX ADMIN — MIDAZOLAM HYDROCHLORIDE 2 MG/KG/HR: 1 INJECTION, SOLUTION INTRAMUSCULAR; INTRAVENOUS at 07:50

## 2018-06-25 RX ADMIN — ALBUTEROL 2 PUFF(S): 90 AEROSOL, METERED ORAL at 10:39

## 2018-06-25 RX ADMIN — PROPOFOL 38.55 MICROGRAM(S)/KG/MIN: 10 INJECTION, EMULSION INTRAVENOUS at 07:49

## 2018-06-25 RX ADMIN — ALBUTEROL 2 PUFF(S): 90 AEROSOL, METERED ORAL at 22:50

## 2018-06-25 RX ADMIN — HEPARIN SODIUM 5000 UNIT(S): 5000 INJECTION INTRAVENOUS; SUBCUTANEOUS at 05:32

## 2018-06-25 RX ADMIN — AZITHROMYCIN 250 MILLIGRAM(S): 500 TABLET, FILM COATED ORAL at 09:35

## 2018-06-25 RX ADMIN — HEPARIN SODIUM 5000 UNIT(S): 5000 INJECTION INTRAVENOUS; SUBCUTANEOUS at 22:06

## 2018-06-25 RX ADMIN — Medication 4: at 11:21

## 2018-06-25 RX ADMIN — MIDAZOLAM HYDROCHLORIDE 2 MG/KG/HR: 1 INJECTION, SOLUTION INTRAMUSCULAR; INTRAVENOUS at 01:15

## 2018-06-25 RX ADMIN — HEPARIN SODIUM 5000 UNIT(S): 5000 INJECTION INTRAVENOUS; SUBCUTANEOUS at 13:55

## 2018-06-25 RX ADMIN — ATORVASTATIN CALCIUM 20 MILLIGRAM(S): 80 TABLET, FILM COATED ORAL at 22:06

## 2018-06-25 NOTE — H&P ADULT - PMH
DM (diabetes mellitus)    HTN (hypertension)    Lumbar spinal stenosis    Morbid obesity, unspecified obesity type    Recurrent UTI    Uterine prolapse

## 2018-06-25 NOTE — H&P ADULT - HISTORY OF PRESENT ILLNESS
67F from [facility] with HTN, HLD, T2DM, uterine prolapse complicated by multiple UTIs, spinal stenosis s/p surgery in [facility] in 1990s with residual wheelchair bound status presents 67F from [facility] with HTN, HLD, T2DM, uterine prolapse complicated by multiple UTIs, spinal stenosis s/p surgery in [facility] in 1990s with residual wheelchair bound status brought in by EMS following witnessed arrest. Per pt's daughter who was present, pt was eating dinner and having difficulty swallowing, appeared short of breath and coughing while eating. Pt's  brought her to the bed to lie down because she appeared weak and unable to sit up at the table. She developed respiratory distress and became unresponsive. Pt's daughter began CPR and family called EMS who reportedly arrived within 3-4 minutes and found pt in PEA arrest, continued ACLS with 1 dose epi given before achieving ROSC,     67F with hx of DM2 p/w witnessed arrest. Patient BIBEMS after ROSC obtained. Patient was noted to just stop breathing at home, EMS called and arrived 3-4 mins later found to be in PEA and CPR initiated. Received epi x1 and rosc obtained. Patient intubated at scene and brought to ED. Family provides more hx that she has had a URI this week and started cipro for presumed UTI. 67F from [facility] with HTN, HLD, T2DM, uterine prolapse complicated by multiple UTIs (last with pansensitive E. coli), spinal stenosis s/p surgery in [facility] in 1990s with residual wheelchair bound status brought in by EMS following witnessed arrest. Per pt's daughter who was present, pt was eating dinner and having difficulty swallowing, appeared short of breath and coughing while eating. Pt's  brought her to the bed to lie down because she appeared weak and unable to sit up at the table. She developed respiratory distress and became unresponsive. Pt's daughter began CPR and family called EMS who reportedly arrived within 3-4 minutes and found pt in PEA arrest, continued ACLS with 1 dose epi given before achieving ROSC, intubated pt in the field. Pt reportedly had been feeling sick over the last few days with worsened productive cough, weakness, and urinary frequency but no dysuria. No abdominal pain or diarrhea. No chest pain. Pt is reportedly cognitively normal at baseline.     In the ED, pt was unresponsive to tactile stimuli off sedation. She was febrile to 102F, tachycardic, tachypneic, and hypertensive. Labs with leukocytosis to 22, Wesley to 1.24 from baseline normal Cr, K 5.4, mild transaminitis.

## 2018-06-25 NOTE — PROGRESS NOTE ADULT - ASSESSMENT
67F from [facility] with HTN, HLD, T2DM, uterine prolapse complicated by multiple UTIs (last with pansensitive E. coli), spinal stenosis s/p surgery in [facility] in 1990s with residual wheelchair bound status brought in by EMS following PEA arrest s/p ROSC and intubation in the field, likely due to aspiration event now complicated lack of responsiveness to tactile stimuli due to hypoxia and aspiration pneumonia.     # Neuro: Possible anoxic brain injury due to hypoxia, was unresponsive to tactile stimuli prior to initiation of sedation  - CT head with no ICH or territorial infarct, suspicious for global hypoxia with subtle loss of gray/white differentiation  - Continue sedation with propofol and versed as on mechanical ventilation  - Versed added to sedation due to concern for seizures with facial muscle twitching   - 24h EEG showed no epileptiform findings, but severe cerebral dysfunction  - Will eventually need MRI w/wo contrast    # CV: S/P PEA arrest but CK and high sensitivity troponin, only mildly elevated requiring minimal dosage of levophed due to sedation  - LV function appears preserved on POCUS  - CK trending down, troponin still increasing so will check another set in AM    # Pulm: On mechanical ventilation, initially intubated for airway protection but also with likely aspiration pneumonia  - Consolidation on POCUS  - Continue zosyn for coverage of aspiration pneumonia, also received vancomycin x1 - no recent hospitalizations  - pH 7.28, pCO2 47 with respiratory acidosis on 16/500/80%/8, PEEP increased to 12 and TV decreased to 450 for ideal body weight, will repeat ABG  -RRVP negative    # ID: Pt with sepsis due to aspiration pneumonia, WBC 22  - Continue zosyn q8h, watch Cr clearance and adjust accordingly  - Received vancomycin x1  - Blood and urine cultures pending    # GI: Pt with likely dysphagia but now comatose  - If mental status improves will need swallowing study pending GOC  - Holding PPI for now  - Ordered glucerna tube feeds     # : Pt with recurrent UTIs due to uterine prolape, reportedly self catheterizes but unclear if actually has urinary retention as on oxybutynin. Per pt's daughter, self catheterization is for urgency.  - UA negative, but started a course of cipro 1 day ago  - Holding oxybutynin    # Endocrine: T2DM  - Check HgbA1c  - ISS  - Holding victoza    # Heme: Anemia to 9.6 with baseline in 10s, microcytic, iron studies consistent with iron deficiency anemia  - Continue iron tabs  - Check iron studies, ferritin, retics 67F from [facility] [place] with HTN, HLD, T2DM, uterine prolapse complicated by multiple UTIs (last with pansensitive E. coli), spinal stenosis s/p surgery in [facility] [place] in 1990s with residual wheelchair bound status brought in by EMS following PEA arrest s/p ROSC and intubation in the field, likely due to aspiration event now complicated lack of responsiveness to tactile stimuli due to hypoxia and aspiration pneumonia.     # Neuro: Possible anoxic brain injury due to hypoxia, was unresponsive to tactile stimuli prior to initiation of sedation  - CT head with no ICH or territorial infarct, suspicious for global hypoxia with subtle loss of gray/white differentiation  - Continue sedation with propofol and versed as on mechanical ventilation  - Versed added to sedation due to concern for seizures with facial muscle twitching   - 24h EEG showed no epileptiform findings, but severe cerebral dysfunction  - Will eventually need MRI w/wo contrast    # CV: S/P PEA arrest but CK and high sensitivity troponin, only mildly elevated requiring minimal dosage of levophed due to sedation  - LV function appears preserved on POCUS  - CK trending down, troponin still increasing so will check another set in AM    # Pulm: On mechanical ventilation, initially intubated for airway protection but also with likely aspiration pneumonia  - Consolidation on POCUS  - Continue zosyn for coverage of aspiration pneumonia, also received vancomycin x1 - no recent hospitalizations  - pH 7.28, pCO2 47 with respiratory acidosis on 16/500/80%/8, PEEP increased to 12 and TV decreased to 450 for ideal body weight, will repeat ABG  -RRVP negative    # ID: Pt with sepsis due to aspiration pneumonia, WBC 22  - Continue zosyn q8h, watch Cr clearance and adjust accordingly  - Received vancomycin x1  - Blood cultures show gram negative rods  - Urine cultures pending    # GI: Pt with likely dysphagia but now comatose  - If mental status improves will need swallowing study pending GOC  - Holding PPI for now  - Ordered glucerna tube feeds     # : Pt with recurrent UTIs due to uterine prolape, reportedly self catheterizes but unclear if actually has urinary retention as on oxybutynin. Per pt's daughter, self catheterization is for urgency.  - UA negative, but started a course of cipro 1 day ago  - Holding oxybutynin    # Endocrine: T2DM  - Check HgbA1c  - ISS  - Holding victoza    # Heme: Anemia to 9.6 with baseline in 10s, microcytic, iron studies consistent with iron deficiency anemia  - Continue iron tabs  - Check iron studies, ferritin, retics 67F from [facility] with HTN, HLD, T2DM, uterine prolapse complicated by multiple UTIs (last with pansensitive E. coli), spinal stenosis s/p surgery in [facility] in 1990s with residual wheelchair bound status brought in by EMS following PEA arrest s/p ROSC and intubation in the field, likely due to aspiration event now complicated lack of responsiveness to tactile stimuli due to hypoxia and aspiration pneumonia.     # Neuro: Possible anoxic brain injury due to hypoxia, was unresponsive to tactile stimuli prior to initiation of sedation  - CT head with no ICH or territorial infarct, suspicious for global hypoxia with subtle loss of gray/white differentiation  - Continue sedation with propofol and versed as on mechanical ventilation  - Versed added to sedation due to concern for seizures with facial muscle twitching   - 24h EEG showed no epileptiform findings, but severe cerebral dysfunction  - Will eventually need MRI w/wo contrast    # CV: S/P PEA arrest but CK and high sensitivity troponin, only mildly elevated requiring minimal dosage of levophed due to sedation  - LV function appears preserved on POCUS  - CK trending down, troponin still increasing so will check another set in AM    # Pulm: On mechanical ventilation, initially intubated for airway protection but also with likely aspiration pneumonia  - Consolidation on POCUS  - Continue zosyn for coverage of aspiration pneumonia, also received vancomycin x1 - no recent hospitalizations  - pH 7.28, pCO2 47 with respiratory acidosis on 16/500/80%/8, PEEP increased to 12 and TV decreased to 450 for ideal body weight, will repeat ABG  -RRVP negative  -Sputum culture shows gram positive rods and cocci    # ID: Pt with sepsis due to aspiration pneumonia, WBC 22  - Continue zosyn q8h, watch Cr clearance and adjust accordingly  - Received vancomycin x1  - Blood cultures show gram negative rods  - Urine cultures pending    # GI: Pt with likely dysphagia but now comatose  - If mental status improves will need swallowing study pending GOC  - Holding PPI for now  - Ordered glucerna tube feeds     # : Pt with recurrent UTIs due to uterine prolape, reportedly self catheterizes but unclear if actually has urinary retention as on oxybutynin. Per pt's daughter, self catheterization is for urgency.  - UA negative, but started a course of cipro 1 day ago  - Holding oxybutynin    # Endocrine: T2DM  - Check HgbA1c  - ISS  - Holding victoza    # Heme: Anemia to 9.6 with baseline in 10s, microcytic, iron studies consistent with iron deficiency anemia  - Continue iron tabs  - Check iron studies, ferritin, retics

## 2018-06-25 NOTE — H&P ADULT - ATTENDING COMMENTS
Cardiac arrest with anoxic changes and what appears to be myoclonic jerks  possible respiratory failure with prolonged anoxia, unclear [place]  repeat Ct head, EEG  Aspiration Pna pancx, Abx

## 2018-06-25 NOTE — H&P ADULT - ASSESSMENT
67F from [facility] with HTN, HLD, T2DM, uterine prolapse complicated by multiple UTIs (last with pansensitive E. coli), spinal stenosis s/p surgery in [facility] in 1990s with residual wheelchair bound status brought in by EMS following PEA arrest s/p ROSC and intubation in the field, likely due to aspiration event now complicated lack of responsiveness to tactile stimuli due to hypoxia and aspiration pneumonia.     # Neuro: Possible anoxic brain injury due to hypoxia, was unresponsive to tactile stimuli prior to initiation of sedation  - CT head with no ICH or territorial infarct, suspicious for global hypoxia with subtle loss of gray/white differentiation  - Continue sedation with propofol and versed as on mechanical ventilation  - Versed added to sedation due to concern for seizures with facial muscle twitching   - 24h EEG  - Will eventually need MRI w/wo contrast    # CV: S/P PEA arrest but CK and high sensitivity troponin, only mildly elevated requiring minimal dosage of levophed due to sedation  - LV function appears preserved on POCUS  - CK trending down, troponin still increasing so will check another set in AM    # Respiratory 67F from [facility] with HTN, HLD, T2DM, uterine prolapse complicated by multiple UTIs (last with pansensitive E. coli), spinal stenosis s/p surgery in [facility] in 1990s with residual wheelchair bound status brought in by EMS following PEA arrest s/p ROSC and intubation in the field, likely due to aspiration event now complicated lack of responsiveness to tactile stimuli due to hypoxia and aspiration pneumonia.     # Neuro: Possible anoxic brain injury due to hypoxia, was unresponsive to tactile stimuli prior to initiation of sedation  - CT head with no ICH or territorial infarct, suspicious for global hypoxia with subtle loss of gray/white differentiation  - Continue sedation with propofol and versed as on mechanical ventilation  - Versed added to sedation due to concern for seizures with facial muscle twitching   - 24h EEG  - Will eventually need MRI w/wo contrast    # CV: S/P PEA arrest but CK and high sensitivity troponin, only mildly elevated requiring minimal dosage of levophed due to sedation  - LV function appears preserved on POCUS  - CK trending down, troponin still increasing so will check another set in AM    # Pulm: On mechanical ventilation, initially intubated for airway protection but also with likely aspiration pneumonia  - Consolidation on POCUS  - Continue zosyn for coverage of aspiration pneumonia, also received vancomycin x1 - no recent hospitalizations  - pH 7.28, pCO2 47 with respiratory acidosis on 16/500/80%/8, PEEP increased to 12 and TV decreased to 450 for ideal body weight, will repeat ABG    # ID: Pt with sepsis due to aspiration pneumonia, WBC 22  - Continue zosyn q8h, watch Cr clearance and adjust accordingly  - Received vancomycin x1  - Blood and urine cultures pending    # GI: Pt with likely dysphagia but now comatose  - If mental status improves will need swallowing study pending GOC  - Holding PPI for now  - Will start feeds in AM    # : Pt with recurrent UTIs due to uterine prolape, reportedly self catheterizes but unclear if actually has urinary retention as on oxybutynin. Per pt's daughter, self catheterization is for urgency.  - UA negative, but started a course of cipro 1 day ago  - Holding oxybutynin    # Endocrine: T2DM  - Check HgbA1c  - ISS  - Holding victoza    # Heme: Anemia to 9.6 with baseline in 10s, microcytic, iron studies consistent with iron deficiency anemia  - Continue iron tabs  - Check iron studies, ferritin, retics

## 2018-06-25 NOTE — H&P ADULT - NSHPPHYSICALEXAM_GEN_ALL_CORE
VS (initial): Tm 102.2, HR 85 (was 110s), /56 (was 160s-180s), RR 16, 100% on vent 16/450/80%/12  General: Intubated, sedated   Skin: Warm and dry  Neuro: Comatose, no response tactile stimuli, pupils sluggish b/l, no spontaneous motions  HEENT: PERRL, EOMI, no oral lesions  Neck: Full range of motion, no JVD  Lungs: Coarse breath sounds b/l, no wheezes  Heart: Regular rate and rhythm, no murmurs  Abdomen: Normoactive bowl sounds, soft, nontender, nondistended  Extremities: No lower extremity tenderness, erythema, trace pedal edema-nonpitting   Vascular: 2+ radial and pedal pulses  Lymph: no cervical lymphadenopathy  Psych: could not assess as comatose

## 2018-06-25 NOTE — H&P ADULT - NSHPLABSRESULTS_GEN_ALL_CORE
9.6    22.85 )-----------( 266      ( 2018 00:12 )             35.0         136  |  101  |  25<H>  ----------------------------<  252<H>  4.8   |  21<L>  |  1.32<H>    Ca    7.6<L>      2018 00:12  Phos  3.3       Mg     2.3         TPro  6.3  /  Alb  3.2<L>  /  TBili  0.7  /  DBili  x   /  AST  67<H>  /  ALT  44<H>  /  AlkPhos  245<H>      PT/INR - ( 2018 21:45 )   PT: 11.3 SEC;   INR: 0.98          PTT - ( 2018 21:45 )  PTT:33.0 SEC  CARDIAC MARKERS ( 2018 00:12 )  x     / x     / 174 u/L / 3.40 ng/mL / x      CARDIAC MARKERS ( 2018 22:10 )  x     / x     / 199 u/L / 2.93 ng/mL / x          ABG - ( 2018 00:40 )  pH, Arterial: 7.28  pH, Blood: x     /  pCO2: 47    /  pO2: 109   / HCO3: 21    / Base Excess: -4.2  /  SaO2: 96.8        Urinalysis Basic - ( 2018 22:15 )    Color: PLYEL / Appearance: CLEAR / S.008 / pH: 7.0  Gluc: 250 / Ketone: NEGATIVE  / Bili: NEGATIVE / Urobili: NORMAL mg/dL   Blood: SMALL / Protein: 150 mg/dL / Nitrite: NEGATIVE   Leuk Esterase: NEGATIVE / RBC: 10-25 / WBC 10-25   Sq Epi: OCC / Non Sq Epi: x / Bacteria: FEW

## 2018-06-26 DIAGNOSIS — R69 ILLNESS, UNSPECIFIED: ICD-10-CM

## 2018-06-26 LAB
ALBUMIN SERPL ELPH-MCNC: 3.1 G/DL — LOW (ref 3.3–5)
ALBUMIN SERPL ELPH-MCNC: 3.4 G/DL — SIGNIFICANT CHANGE UP (ref 3.3–5)
ALP SERPL-CCNC: 199 U/L — HIGH (ref 40–120)
ALP SERPL-CCNC: 227 U/L — HIGH (ref 40–120)
ALT FLD-CCNC: 28 U/L — SIGNIFICANT CHANGE UP (ref 4–33)
ALT FLD-CCNC: 34 U/L — HIGH (ref 4–33)
AST SERPL-CCNC: 42 U/L — HIGH (ref 4–32)
AST SERPL-CCNC: 98 U/L — HIGH (ref 4–32)
BACTERIA UR CULT: SIGNIFICANT CHANGE UP
BASE EXCESS BLDA CALC-SCNC: -0.4 MMOL/L — SIGNIFICANT CHANGE UP
BASE EXCESS BLDA CALC-SCNC: 0.3 MMOL/L — SIGNIFICANT CHANGE UP
BASE EXCESS BLDA CALC-SCNC: 0.5 MMOL/L — SIGNIFICANT CHANGE UP
BASE EXCESS BLDA CALC-SCNC: 1.1 MMOL/L — SIGNIFICANT CHANGE UP
BASE EXCESS BLDA CALC-SCNC: 1.1 MMOL/L — SIGNIFICANT CHANGE UP
BASOPHILS # BLD AUTO: 0.04 K/UL — SIGNIFICANT CHANGE UP (ref 0–0.2)
BASOPHILS NFR BLD AUTO: 0.2 % — SIGNIFICANT CHANGE UP (ref 0–2)
BILIRUB SERPL-MCNC: 0.3 MG/DL — SIGNIFICANT CHANGE UP (ref 0.2–1.2)
BILIRUB SERPL-MCNC: 0.4 MG/DL — SIGNIFICANT CHANGE UP (ref 0.2–1.2)
BUN SERPL-MCNC: 22 MG/DL — SIGNIFICANT CHANGE UP (ref 7–23)
BUN SERPL-MCNC: 22 MG/DL — SIGNIFICANT CHANGE UP (ref 7–23)
CALCIUM SERPL-MCNC: 8.7 MG/DL — SIGNIFICANT CHANGE UP (ref 8.4–10.5)
CALCIUM SERPL-MCNC: 9.4 MG/DL — SIGNIFICANT CHANGE UP (ref 8.4–10.5)
CHLORIDE BLDA-SCNC: 110 MMOL/L — HIGH (ref 96–108)
CHLORIDE BLDA-SCNC: 116 MMOL/L — HIGH (ref 96–108)
CHLORIDE SERPL-SCNC: 117 MMOL/L — HIGH (ref 98–107)
CHLORIDE SERPL-SCNC: 99 MMOL/L — SIGNIFICANT CHANGE UP (ref 98–107)
CHLORIDE UR-SCNC: 40 MMOL/L — SIGNIFICANT CHANGE UP
CO2 SERPL-SCNC: 22 MMOL/L — SIGNIFICANT CHANGE UP (ref 22–31)
CO2 SERPL-SCNC: 24 MMOL/L — SIGNIFICANT CHANGE UP (ref 22–31)
CREAT SERPL-MCNC: 1.4 MG/DL — HIGH (ref 0.5–1.3)
CREAT SERPL-MCNC: 1.41 MG/DL — HIGH (ref 0.5–1.3)
EOSINOPHIL # BLD AUTO: 0 K/UL — SIGNIFICANT CHANGE UP (ref 0–0.5)
EOSINOPHIL NFR BLD AUTO: 0 % — SIGNIFICANT CHANGE UP (ref 0–6)
GLUCOSE BLDA-MCNC: 283 MG/DL — HIGH (ref 70–99)
GLUCOSE BLDA-MCNC: 294 MG/DL — HIGH (ref 70–99)
GLUCOSE BLDC GLUCOMTR-MCNC: 175 MG/DL — HIGH (ref 70–99)
GLUCOSE BLDC GLUCOMTR-MCNC: 211 MG/DL — HIGH (ref 70–99)
GLUCOSE BLDC GLUCOMTR-MCNC: 255 MG/DL — HIGH (ref 70–99)
GLUCOSE BLDC GLUCOMTR-MCNC: 280 MG/DL — HIGH (ref 70–99)
GLUCOSE BLDC GLUCOMTR-MCNC: 291 MG/DL — HIGH (ref 70–99)
GLUCOSE SERPL-MCNC: 196 MG/DL — HIGH (ref 70–99)
GLUCOSE SERPL-MCNC: 295 MG/DL — HIGH (ref 70–99)
HCO3 BLDA-SCNC: 23 MMOL/L — SIGNIFICANT CHANGE UP (ref 22–26)
HCO3 BLDA-SCNC: 24 MMOL/L — SIGNIFICANT CHANGE UP (ref 22–26)
HCO3 BLDA-SCNC: 25 MMOL/L — SIGNIFICANT CHANGE UP (ref 22–26)
HCT VFR BLD CALC: 36.8 % — SIGNIFICANT CHANGE UP (ref 34.5–45)
HCT VFR BLD CALC: 40.3 % — SIGNIFICANT CHANGE UP (ref 34.5–45)
HCT VFR BLDA CALC: 33.6 % — LOW (ref 34.5–46.5)
HCT VFR BLDA CALC: 37.5 % — SIGNIFICANT CHANGE UP (ref 34.5–46.5)
HGB BLD-MCNC: 11 G/DL — LOW (ref 11.5–15.5)
HGB BLD-MCNC: 11.5 G/DL — SIGNIFICANT CHANGE UP (ref 11.5–15.5)
HGB BLDA-MCNC: 10.9 G/DL — LOW (ref 11.5–15.5)
HGB BLDA-MCNC: 12.2 G/DL — SIGNIFICANT CHANGE UP (ref 11.5–15.5)
IMM GRANULOCYTES # BLD AUTO: 0.2 # — SIGNIFICANT CHANGE UP
IMM GRANULOCYTES NFR BLD AUTO: 0.8 % — SIGNIFICANT CHANGE UP (ref 0–1.5)
LACTATE BLDA-SCNC: 0.9 MMOL/L — SIGNIFICANT CHANGE UP (ref 0.5–2)
LACTATE BLDA-SCNC: 1.2 MMOL/L — SIGNIFICANT CHANGE UP (ref 0.5–2)
LYMPHOCYTES # BLD AUTO: 1 K/UL — SIGNIFICANT CHANGE UP (ref 1–3.3)
LYMPHOCYTES # BLD AUTO: 4.2 % — LOW (ref 13–44)
MAGNESIUM SERPL-MCNC: 2.4 MG/DL — SIGNIFICANT CHANGE UP (ref 1.6–2.6)
MAGNESIUM SERPL-MCNC: 2.6 MG/DL — SIGNIFICANT CHANGE UP (ref 1.6–2.6)
MCHC RBC-ENTMCNC: 17.3 PG — LOW (ref 27–34)
MCHC RBC-ENTMCNC: 17.6 PG — LOW (ref 27–34)
MCHC RBC-ENTMCNC: 28.5 % — LOW (ref 32–36)
MCHC RBC-ENTMCNC: 29.9 % — LOW (ref 32–36)
MCV RBC AUTO: 58.8 FL — LOW (ref 80–100)
MCV RBC AUTO: 60.6 FL — LOW (ref 80–100)
MONOCYTES # BLD AUTO: 1.28 K/UL — HIGH (ref 0–0.9)
MONOCYTES NFR BLD AUTO: 5.4 % — SIGNIFICANT CHANGE UP (ref 2–14)
NEUTROPHILS # BLD AUTO: 21.28 K/UL — HIGH (ref 1.8–7.4)
NEUTROPHILS NFR BLD AUTO: 89.4 % — HIGH (ref 43–77)
NRBC # FLD: 0 — SIGNIFICANT CHANGE UP
NRBC # FLD: 0 — SIGNIFICANT CHANGE UP
ORGANISM # SPEC MICROSCOPIC CNT: SIGNIFICANT CHANGE UP
OSMOLALITY SERPL: 330 MOSMO/KG — HIGH (ref 275–295)
PCO2 BLDA: 31 MMHG — LOW (ref 32–48)
PCO2 BLDA: 37 MMHG — SIGNIFICANT CHANGE UP (ref 32–48)
PCO2 BLDA: 40 MMHG — SIGNIFICANT CHANGE UP (ref 32–48)
PCO2 BLDA: 72 MMHG — CRITICAL HIGH (ref 32–48)
PCO2 BLDA: 76 MMHG — CRITICAL HIGH (ref 32–48)
PH BLDA: 7.2 PH — CRITICAL LOW (ref 7.35–7.45)
PH BLDA: 7.22 PH — LOW (ref 7.35–7.45)
PH BLDA: 7.41 PH — SIGNIFICANT CHANGE UP (ref 7.35–7.45)
PH BLDA: 7.43 PH — SIGNIFICANT CHANGE UP (ref 7.35–7.45)
PH BLDA: 7.48 PH — HIGH (ref 7.35–7.45)
PHOSPHATE SERPL-MCNC: 2.1 MG/DL — LOW (ref 2.5–4.5)
PHOSPHATE SERPL-MCNC: 2.7 MG/DL — SIGNIFICANT CHANGE UP (ref 2.5–4.5)
PLATELET # BLD AUTO: 273 K/UL — SIGNIFICANT CHANGE UP (ref 150–400)
PLATELET # BLD AUTO: 296 K/UL — SIGNIFICANT CHANGE UP (ref 150–400)
PMV BLD: SIGNIFICANT CHANGE UP FL (ref 7–13)
PMV BLD: SIGNIFICANT CHANGE UP FL (ref 7–13)
PO2 BLDA: 107 MMHG — SIGNIFICANT CHANGE UP (ref 83–108)
PO2 BLDA: 140 MMHG — HIGH (ref 83–108)
PO2 BLDA: 180 MMHG — HIGH (ref 83–108)
PO2 BLDA: 217 MMHG — HIGH (ref 83–108)
PO2 BLDA: 26 MMHG — CRITICAL LOW (ref 83–108)
POTASSIUM BLDA-SCNC: 3.2 MMOL/L — LOW (ref 3.4–4.5)
POTASSIUM BLDA-SCNC: 3.3 MMOL/L — LOW (ref 3.4–4.5)
POTASSIUM SERPL-MCNC: 3.3 MMOL/L — LOW (ref 3.5–5.3)
POTASSIUM SERPL-MCNC: 3.7 MMOL/L — SIGNIFICANT CHANGE UP (ref 3.5–5.3)
POTASSIUM SERPL-SCNC: 3.3 MMOL/L — LOW (ref 3.5–5.3)
POTASSIUM SERPL-SCNC: 3.7 MMOL/L — SIGNIFICANT CHANGE UP (ref 3.5–5.3)
POTASSIUM UR-SCNC: 23.5 MMOL/L — SIGNIFICANT CHANGE UP
PROT SERPL-MCNC: 6.9 G/DL — SIGNIFICANT CHANGE UP (ref 6–8.3)
PROT SERPL-MCNC: 7 G/DL — SIGNIFICANT CHANGE UP (ref 6–8.3)
RBC # BLD: 6.26 M/UL — HIGH (ref 3.8–5.2)
RBC # BLD: 6.65 M/UL — HIGH (ref 3.8–5.2)
RBC # FLD: 23 % — HIGH (ref 10.3–14.5)
RBC # FLD: 23.6 % — HIGH (ref 10.3–14.5)
SAO2 % BLDA: 30.1 % — LOW (ref 95–99)
SAO2 % BLDA: 97.4 % — SIGNIFICANT CHANGE UP (ref 95–99)
SAO2 % BLDA: 97.5 % — SIGNIFICANT CHANGE UP (ref 95–99)
SAO2 % BLDA: 97.8 % — SIGNIFICANT CHANGE UP (ref 95–99)
SAO2 % BLDA: 98.6 % — SIGNIFICANT CHANGE UP (ref 95–99)
SODIUM BLDA-SCNC: 140 MMOL/L — SIGNIFICANT CHANGE UP (ref 136–146)
SODIUM BLDA-SCNC: 153 MMOL/L — HIGH (ref 136–146)
SODIUM SERPL-SCNC: 135 MMOL/L — SIGNIFICANT CHANGE UP (ref 135–145)
SODIUM SERPL-SCNC: 155 MMOL/L — HIGH (ref 135–145)
SODIUM UR-SCNC: 43 MMOL/L — SIGNIFICANT CHANGE UP
SPECIMEN SOURCE: SIGNIFICANT CHANGE UP
VANCOMYCIN FLD-MCNC: 4.7 UG/ML — SIGNIFICANT CHANGE UP
WBC # BLD: 21.45 K/UL — HIGH (ref 3.8–10.5)
WBC # BLD: 23.8 K/UL — HIGH (ref 3.8–10.5)
WBC # FLD AUTO: 21.45 K/UL — HIGH (ref 3.8–10.5)
WBC # FLD AUTO: 23.8 K/UL — HIGH (ref 3.8–10.5)

## 2018-06-26 PROCEDURE — 95951: CPT | Mod: 26

## 2018-06-26 PROCEDURE — 99291 CRITICAL CARE FIRST HOUR: CPT

## 2018-06-26 RX ORDER — DESMOPRESSIN ACETATE 0.1 MG/1
0.2 TABLET ORAL ONCE
Qty: 0 | Refills: 0 | Status: DISCONTINUED | OUTPATIENT
Start: 2018-06-26 | End: 2018-06-26

## 2018-06-26 RX ORDER — SODIUM CHLORIDE 9 MG/ML
1000 INJECTION, SOLUTION INTRAVENOUS
Qty: 0 | Refills: 0 | Status: DISCONTINUED | OUTPATIENT
Start: 2018-06-26 | End: 2018-06-27

## 2018-06-26 RX ORDER — CHLORHEXIDINE GLUCONATE 213 G/1000ML
1 SOLUTION TOPICAL
Qty: 0 | Refills: 0 | Status: DISCONTINUED | OUTPATIENT
Start: 2018-06-26 | End: 2018-06-28

## 2018-06-26 RX ORDER — LOSARTAN POTASSIUM 100 MG/1
100 TABLET, FILM COATED ORAL DAILY
Qty: 0 | Refills: 0 | Status: DISCONTINUED | OUTPATIENT
Start: 2018-06-26 | End: 2018-06-26

## 2018-06-26 RX ORDER — DESMOPRESSIN ACETATE 0.1 MG/1
2 TABLET ORAL ONCE
Qty: 0 | Refills: 0 | Status: DISCONTINUED | OUTPATIENT
Start: 2018-06-26 | End: 2018-06-26

## 2018-06-26 RX ORDER — MEROPENEM 1 G/30ML
1000 INJECTION INTRAVENOUS EVERY 8 HOURS
Qty: 0 | Refills: 0 | Status: DISCONTINUED | OUTPATIENT
Start: 2018-06-26 | End: 2018-06-28

## 2018-06-26 RX ORDER — POTASSIUM CHLORIDE 20 MEQ
40 PACKET (EA) ORAL ONCE
Qty: 0 | Refills: 0 | Status: COMPLETED | OUTPATIENT
Start: 2018-06-26 | End: 2018-06-26

## 2018-06-26 RX ORDER — SODIUM CHLORIDE 9 MG/ML
500 INJECTION INTRAMUSCULAR; INTRAVENOUS; SUBCUTANEOUS ONCE
Qty: 0 | Refills: 0 | Status: COMPLETED | OUTPATIENT
Start: 2018-06-26 | End: 2018-06-26

## 2018-06-26 RX ORDER — CHLORHEXIDINE GLUCONATE 213 G/1000ML
15 SOLUTION TOPICAL
Qty: 0 | Refills: 0 | Status: DISCONTINUED | OUTPATIENT
Start: 2018-06-26 | End: 2018-06-28

## 2018-06-26 RX ORDER — SODIUM CHLORIDE 9 MG/ML
500 INJECTION, SOLUTION INTRAVENOUS ONCE
Qty: 0 | Refills: 0 | Status: DISCONTINUED | OUTPATIENT
Start: 2018-06-26 | End: 2018-06-26

## 2018-06-26 RX ORDER — NOREPINEPHRINE BITARTRATE/D5W 8 MG/250ML
0.03 PLASTIC BAG, INJECTION (ML) INTRAVENOUS
Qty: 8 | Refills: 0 | Status: DISCONTINUED | OUTPATIENT
Start: 2018-06-26 | End: 2018-06-27

## 2018-06-26 RX ORDER — VANCOMYCIN HCL 1 G
1500 VIAL (EA) INTRAVENOUS ONCE
Qty: 0 | Refills: 0 | Status: COMPLETED | OUTPATIENT
Start: 2018-06-26 | End: 2018-06-26

## 2018-06-26 RX ORDER — DESMOPRESSIN ACETATE 0.1 MG/1
2 TABLET ORAL ONCE
Qty: 0 | Refills: 0 | Status: COMPLETED | OUTPATIENT
Start: 2018-06-26 | End: 2018-06-26

## 2018-06-26 RX ADMIN — Medication 4: at 05:27

## 2018-06-26 RX ADMIN — MEROPENEM 100 MILLIGRAM(S): 1 INJECTION INTRAVENOUS at 13:30

## 2018-06-26 RX ADMIN — ATORVASTATIN CALCIUM 20 MILLIGRAM(S): 80 TABLET, FILM COATED ORAL at 22:00

## 2018-06-26 RX ADMIN — Medication 1 APPLICATION(S): at 05:00

## 2018-06-26 RX ADMIN — HEPARIN SODIUM 5000 UNIT(S): 5000 INJECTION INTRAVENOUS; SUBCUTANEOUS at 22:00

## 2018-06-26 RX ADMIN — Medication 6: at 11:50

## 2018-06-26 RX ADMIN — Medication 7.23 MICROGRAM(S)/KG/MIN: at 11:51

## 2018-06-26 RX ADMIN — SODIUM CHLORIDE 1000 MILLILITER(S): 9 INJECTION INTRAMUSCULAR; INTRAVENOUS; SUBCUTANEOUS at 06:15

## 2018-06-26 RX ADMIN — Medication 1 APPLICATION(S): at 18:11

## 2018-06-26 RX ADMIN — ALBUTEROL 2 PUFF(S): 90 AEROSOL, METERED ORAL at 03:27

## 2018-06-26 RX ADMIN — CHLORHEXIDINE GLUCONATE 15 MILLILITER(S): 213 SOLUTION TOPICAL at 05:00

## 2018-06-26 RX ADMIN — Medication 40 MILLIEQUIVALENT(S): at 23:46

## 2018-06-26 RX ADMIN — LOSARTAN POTASSIUM 100 MILLIGRAM(S): 100 TABLET, FILM COATED ORAL at 02:21

## 2018-06-26 RX ADMIN — Medication 7.23 MICROGRAM(S)/KG/MIN: at 19:37

## 2018-06-26 RX ADMIN — Medication 6: at 18:20

## 2018-06-26 RX ADMIN — Medication 300 MILLIGRAM(S): at 09:16

## 2018-06-26 RX ADMIN — CHLORHEXIDINE GLUCONATE 1 APPLICATION(S): 213 SOLUTION TOPICAL at 12:04

## 2018-06-26 RX ADMIN — HEPARIN SODIUM 5000 UNIT(S): 5000 INJECTION INTRAVENOUS; SUBCUTANEOUS at 05:00

## 2018-06-26 RX ADMIN — MEROPENEM 100 MILLIGRAM(S): 1 INJECTION INTRAVENOUS at 22:00

## 2018-06-26 RX ADMIN — Medication 2: at 00:11

## 2018-06-26 RX ADMIN — CHLORHEXIDINE GLUCONATE 15 MILLILITER(S): 213 SOLUTION TOPICAL at 18:21

## 2018-06-26 RX ADMIN — AZITHROMYCIN 250 MILLIGRAM(S): 500 TABLET, FILM COATED ORAL at 07:47

## 2018-06-26 RX ADMIN — ALBUTEROL 2 PUFF(S): 90 AEROSOL, METERED ORAL at 15:35

## 2018-06-26 RX ADMIN — ALBUTEROL 2 PUFF(S): 90 AEROSOL, METERED ORAL at 23:55

## 2018-06-26 RX ADMIN — Medication 1000 UNIT(S): at 11:48

## 2018-06-26 RX ADMIN — SODIUM CHLORIDE 150 MILLILITER(S): 9 INJECTION, SOLUTION INTRAVENOUS at 18:21

## 2018-06-26 RX ADMIN — Medication 650 MILLIGRAM(S): at 04:31

## 2018-06-26 RX ADMIN — HEPARIN SODIUM 5000 UNIT(S): 5000 INJECTION INTRAVENOUS; SUBCUTANEOUS at 13:30

## 2018-06-26 RX ADMIN — ALBUTEROL 2 PUFF(S): 90 AEROSOL, METERED ORAL at 10:42

## 2018-06-26 RX ADMIN — Medication 6: at 23:59

## 2018-06-26 RX ADMIN — PIPERACILLIN AND TAZOBACTAM 25 GRAM(S): 4; .5 INJECTION, POWDER, LYOPHILIZED, FOR SOLUTION INTRAVENOUS at 05:00

## 2018-06-26 RX ADMIN — DESMOPRESSIN ACETATE 2 MICROGRAM(S): 0.1 TABLET ORAL at 20:01

## 2018-06-26 NOTE — PROGRESS NOTE ADULT - ASSESSMENT
67F from [facility] with HTN, HLD, T2DM, uterine prolapse complicated by multiple UTIs (last with pansensitive E. coli), spinal stenosis s/p surgery in [facility] in 1990s with residual wheelchair bound status brought in by EMS following PEA arrest s/p ROSC and intubation in the field, likely due to aspiration event now complicated lack of responsiveness to tactile stimuli due to hypoxia and aspiration pneumonia. Patient remains unresponsive to all     # Neuro: Possible anoxic brain injury due to hypoxia, was unresponsive to tactile stimuli prior to initiation of sedation  - CT head with no ICH or territorial infarct, suspicious for global hypoxia with subtle loss of gray/white differentiation  - Patient DC'd from sedation, remains unresponsive  - 24h EEG showed no epileptiform findings, but severe cerebral dysfunction  - Pending apnea ABG    # CV: S/P PEA arrest but CK and high sensitivity troponin, only mildly elevated requiring minimal dosage of levophed due to sedation  - LV function appears preserved on POCUS  - CK trending down    # Pulm: On mechanical ventilation, initially intubated for airway protection but also with likely aspiration pneumonia  - Consolidation on POCUS  - D/C zosyn for coverage of aspiration pneumonia  - Received vancomycin x1 - no recent hospitalizations  - RRVP negative  - Sputum culture shows gram positive rods and cocci    # ID: Pt with sepsis due to aspiration pneumonia, WBC 22  - D/C zosyn  - Received vancomycin x2  - Blood cultures show gram negative rods, PCR+ E. coli  - Started on meropenem  - Urine cultures negative, but patient has chronic recurrent UTIs and was treated with ciprofloxacin recently, possible AB resistance    # GI: Pt with likely dysphagia but now comatose  - Holding PPI for now  - Ordered glucerna tube feeds     # : Pt with recurrent UTIs due to uterine prolape, reportedly self catheterizes but unclear if actually has urinary retention as on oxybutynin. Per pt's daughter, self catheterization is for urgency.  - UA negative, but started a course of cipro 1 day ago  - Holding oxybutynin    # Endocrine: T2DM  - Check HgbA1c  - ISS  - Holding victoza    # Heme: Anemia to 9.6 with baseline in 10s, microcytic, iron studies consistent with iron deficiency anemia  - Continue iron tabs  - Check iron studies, ferritin, retics    Spoke with patient's family about possibility of brain death. Going through protocol to determine if patient meets criteria for brain death. 67F from [facility] with HTN, HLD, T2DM, uterine prolapse complicated by multiple UTIs (last with pansensitive E. coli), spinal stenosis s/p surgery in [facility] in 1990s with residual wheelchair bound status brought in by EMS following PEA arrest s/p ROSC and intubation in the field, likely due to aspiration event now complicated lack of responsiveness to tactile stimuli due to hypoxia and aspiration pneumonia. Patient remains unresponsive to all stimuli following discontinuation from sedation.    # Neuro: Possible anoxic brain injury due to hypoxia, was unresponsive to tactile stimuli prior to initiation of sedation  - CT head with no ICH or territorial infarct, suspicious for global hypoxia with subtle loss of gray/white differentiation  - Patient DC'd from sedation, remains unresponsive  - 24h EEG showed no epileptiform findings, but severe cerebral dysfunction  - Pending apnea ABG    # CV: S/P PEA arrest but CK and high sensitivity troponin, only mildly elevated requiring minimal dosage of levophed due to sedation  - LV function appears preserved on POCUS  - CK trending down    # Pulm: On mechanical ventilation, initially intubated for airway protection but also with likely aspiration pneumonia  - Consolidation on POCUS  - D/C zosyn for coverage of aspiration pneumonia  - Received vancomycin x1 - no recent hospitalizations  - RRVP negative  - Sputum culture shows gram positive rods and cocci    # ID: Pt with sepsis due to aspiration pneumonia, WBC 22  - D/C zosyn  - Received vancomycin x2  - Blood cultures show gram negative rods, PCR+ E. coli  - Started on meropenem  - Urine cultures negative, but patient has chronic recurrent UTIs and was treated with ciprofloxacin recently, possible AB resistance    # GI: Pt with likely dysphagia but now comatose  - Holding PPI for now  - Ordered glucerna tube feeds     # : Pt with recurrent UTIs due to uterine prolape, reportedly self catheterizes but unclear if actually has urinary retention as on oxybutynin. Per pt's daughter, self catheterization is for urgency.  - UA negative, but started a course of cipro 1 day ago  - Holding oxybutynin    # Endocrine: T2DM  - Check HgbA1c  - ISS  - Holding victoza    # Heme: Anemia to 9.6 with baseline in 10s, microcytic, iron studies consistent with iron deficiency anemia  - Continue iron tabs  - Check iron studies, ferritin, retics    Spoke with patient's family about possibility of brain death. Going through protocol to determine if patient meets criteria for brain death. 67F from [facility] with HTN, HLD, T2DM, uterine prolapse complicated by multiple UTIs (last with pansensitive E. coli), spinal stenosis s/p surgery in [facility] in 1990s with residual wheelchair bound status brought in by EMS following PEA arrest s/p ROSC and intubation in the field, likely due to aspiration event now complicated lack of responsiveness to tactile stimuli due to hypoxia and aspiration pneumonia. Patient remains unresponsive to all stimuli following discontinuation from sedation.    # Neuro: Possible anoxic brain injury due to hypoxia, was unresponsive to tactile stimuli prior to initiation of sedation  - CT head with no ICH or territorial infarct, suspicious for global hypoxia with subtle loss of gray/white differentiation  - Patient DC'd from sedation, remains unresponsive  - 24h EEG showed no epileptiform findings, but severe cerebral dysfunction  - Pending apnea ABG    # CV: S/P PEA arrest but CK and high sensitivity troponin, only mildly elevated requiring minimal dosage of levophed due to sedation  - LV function appears preserved on POCUS  - CK trending down  - Patient was hypertensive overnight, started on antihypertensives, and then discontinued this morning  - Patient started on levophed.    # Pulm: On mechanical ventilation, initially intubated for airway protection but also with likely aspiration pneumonia  - Consolidation on POCUS  - D/C zosyn for coverage of aspiration pneumonia  - Received vancomycin x1 - no recent hospitalizations  - RRVP negative  - Sputum culture shows gram positive rods and cocci    # ID: Pt with sepsis due to aspiration pneumonia, WBC 22  - D/C zosyn  - Received vancomycin x2  - Blood cultures show gram negative rods, PCR+ E. coli  - Started on meropenem  - Urine cultures negative, but patient has chronic recurrent UTIs and was treated with ciprofloxacin recently, possible AB resistance    # GI: Pt with likely dysphagia but now comatose  - Holding PPI for now  - Ordered glucerna tube feeds     # : Pt with recurrent UTIs due to uterine prolape, reportedly self catheterizes but unclear if actually has urinary retention as on oxybutynin. Per pt's daughter, self catheterization is for urgency.  - UA negative, but started a course of cipro 1 day ago  - Holding oxybutynin    # Endocrine: T2DM  - Check HgbA1c  - ISS  - Holding victoza    # Heme: Anemia to 9.6 with baseline in 10s, microcytic, iron studies consistent with iron deficiency anemia  - Continue iron tabs  - Check iron studies, ferritin, retics    Spoke with patient's family about possibility of brain death. Going through protocol to determine if patient meets criteria for brain death. 67F from [facility] with HTN, HLD, T2DM, uterine prolapse complicated by multiple UTIs (last with pansensitive E. coli), spinal stenosis s/p surgery in [facility] in 1990s with residual wheelchair bound status brought in by EMS following PEA arrest s/p ROSC and intubation in the field, likely due to aspiration event now complicated lack of responsiveness to tactile stimuli due to hypoxia and aspiration pneumonia. Patient's hospital course complicated by fever and bacteremia. Patient remains unresponsive to all stimuli following discontinuation from sedation.    # Neuro: Possible anoxic brain injury due to hypoxia, was unresponsive to tactile stimuli prior to initiation of sedation  - CT head with no ICH or territorial infarct, suspicious for global hypoxia with subtle loss of gray/white differentiation  - Patient DC'd from sedation, remains unresponsive  - 24h EEG showed no epileptiform findings, but severe cerebral dysfunction  - Pending apnea ABG    # CV: S/P PEA arrest but CK and high sensitivity troponin, only mildly elevated requiring minimal dosage of levophed due to sedation  - LV function appears preserved on POCUS  - CK trending down  - Patient was hypertensive overnight, started on antihypertensives, and then discontinued this morning  - Patient started on levophed.    # Pulm: On mechanical ventilation, initially intubated for airway protection but also with likely aspiration pneumonia  - Consolidation on POCUS  - RRVP negative  - Sputum culture shows gram positive rods and cocci    # ID: Pt with sepsis due to aspiration pneumonia, WBC 22  - D/C zosyn (received 6/24-6/25)  - Received vancomycin x2 (6/24, 6/25)  - Blood cultures show gram negative rods, PCR+ E. coli  - Started on meropenem 6/26  - Urine cultures negative, but patient has chronic recurrent UTIs and was treated with ciprofloxacin recently, possible AB resistance    # GI: Pt with likely dysphagia but now comatose  - Holding PPI for now  - Ordered glucerna tube feeds     # : Pt with recurrent UTIs due to uterine prolape, reportedly self catheterizes but unclear if actually has urinary retention as on oxybutynin. Per pt's daughter, self catheterization is for urgency.  - UA negative, but started a course of cipro 1 day ago  - Holding oxybutynin    # Endocrine: T2DM  - Check HgbA1c  - ISS  - Holding victoza    # Heme: Anemia to 9.6 with baseline in 10s, microcytic, iron studies consistent with iron deficiency anemia  - Continue iron tabs  - Check iron studies, ferritin, retics    Spoke with patient's family about possibility of brain death. Going through protocol to determine if patient meets criteria for brain death.

## 2018-06-26 NOTE — EEG REPORT - NS EEG TEXT BOX
AUGIE HERNANDEZ [id number]     Study Date: 		06-25-18    ROUTINE EEG    Technical Information:			  		  Placement and Labeling of Electrodes:  The EEG was performed utilizing 20 channels referential EEG connections (coronal over temporal over parasagittal montage) using all standard 10-20 electrode placements with EKG.  Recording was at a sampling rate of 256 samples per second per channel.  Time synchronized digital video recording was done simultaneously with EEG recording.  A low light infrared camera was used for low light recording.  Pasquale and seizure detection algorithms were utilized.    CSA Technical Component:  Quantitative EEG analysis using a separate Compressed Spectral Array (CSA) software package was conducted in real-time and run at bedside after set up by the technician, digitally displaying the power of electrographic frequencies included in the 1-30Hz band using a graded color map.  This data was reviewed and interpreted independently, and is reported in a separate section below.    --------------------------------------------------------------------------------------------------  History:  CC/ HPI Patient is a 67y old  Female who presents with a chief complaint of PEA Arrest (25 Jun 2018 03:09)    MEDICATIONS  (STANDING):  ALBUTerol    90 MICROgram(s) HFA Inhaler 2 Puff(s) Inhalation every 6 hours  atorvastatin 20 milliGRAM(s) Oral at bedtime  azithromycin  IVPB      cholecalciferol 1000 Unit(s) Oral daily  DULoxetine 30 milliGRAM(s) Oral daily  heparin  Injectable 5000 Unit(s) SubCutaneous every 8 hours  insulin lispro (HumaLOG) corrective regimen sliding scale   SubCutaneous every 6 hours  midazolam Infusion 0.016 mG/kG/Hr (2 mL/Hr) IV Continuous <Continuous>  norepinephrine Infusion 0.05 MICROgram(s)/kG/Min (12.047 mL/Hr) IV Continuous <Continuous>  piperacillin/tazobactam IVPB. 3.375 Gram(s) IV Intermittent every 8 hours  propofol Infusion 50 MICROgram(s)/kG/Min (38.55 mL/Hr) IV Continuous <Continuous>    --------------------------------------------------------------------------------------------------  Study Interpretation:    FINDINGS:  The background was continuously attenuated, low amplitude, with possible fast cerebral activity around 10uV, though EEG highly artifactual due to facial/scalp myogenic artifact.  PDR or Stage II sleep transients were not recorded.    Epileptiform Activity:   No epileptiform discharges were present.    Events:  No clinical events were recorded.  No seizures were recorded.    Activation Procedures:   -Hyperventilation was not performed.    -Photic stimulation was not performed.    Artifacts:  Intermittent myogenic and movement artifacts were noted.    ECG:  The heart rate on single channel ECG was predominantly between 80-90BPM.  -----------------------------------------------------------------------------------------------------    EEG Classification / Summary:  Abnormal EEG Study   -diffuse attenuation  -----------------------------------------------------------------------------------------------------    Clinical Impression:  There were no epileptiform abnormalities recorded.  Severe cerebral dysfunction evident.    -------------------------------------------------------------------------------------------------------  Best Claire M.D.   of Neurology, [facility] [place]
GRETELGINETTE AUGIE [id number]     Study Date: 		06-25 to 6-26-18    --------------------------------------------------------------------------------------------------  History:  CC/ HPI Patient is a 67y old  Female who presents with a chief complaint of PEA Arrest (25 Jun 2018 03:09)    MEDICATIONS  (STANDING):  ALBUTerol    90 MICROgram(s) HFA Inhaler 2 Puff(s) Inhalation every 6 hours  atorvastatin 20 milliGRAM(s) Oral at bedtime  azithromycin  IVPB      azithromycin  IVPB 500 milliGRAM(s) IV Intermittent every 24 hours  chlorhexidine 0.12% Liquid 15 milliLiter(s) Swish and Spit two times a day  chlorhexidine 4% Liquid 1 Application(s) Topical <User Schedule>  cholecalciferol 1000 Unit(s) Oral daily  DULoxetine 30 milliGRAM(s) Oral daily  heparin  Injectable 5000 Unit(s) SubCutaneous every 8 hours  insulin lispro (HumaLOG) corrective regimen sliding scale   SubCutaneous every 6 hours  norepinephrine Infusion 0.03 MICROgram(s)/kG/Min (7.228 mL/Hr) IV Continuous <Continuous>  petrolatum Ophthalmic Ointment 1 Application(s) Both EYES every 12 hours  piperacillin/tazobactam IVPB. 3.375 Gram(s) IV Intermittent every 8 hours    MEDICATIONS  (PRN):  acetaminophen    Suspension 650 milliGRAM(s) Oral every 6 hours PRN For Temp greater than 38 C (100.4 F)  dextrose 40% Gel 15 Gram(s) Oral once PRN Blood Glucose LESS THAN 70 milliGRAM(s)/deciliter  glucagon  Injectable 1 milliGRAM(s) IntraMuscular once PRN Glucose LESS THAN 70 milligrams/deciliter      --------------------------------------------------------------------------------------------------  Study Interpretation:    FINDINGS:  The background was continuously attenuated, low amplitude, with no cerebral activity above 5uV. Earlier EEG highly artifactual due to facial/scalp myogenic artifact, C3 electrode artifact.  Later record more obviously attenuated.  PDR or Stage II sleep transients were not recorded.    Epileptiform Activity:   No epileptiform discharges were present.    Events:  No clinical events were recorded.  No seizures were recorded.    Activation Procedures:   -Hyperventilation was not performed.    -Photic stimulation was not performed.    Artifacts:  Intermittent myogenic and movement artifacts were noted.    ECG:  The heart rate on single channel ECG was predominantly between 80-90BPM.  -----------------------------------------------------------------------------------------------------    EEG Classification / Summary:  Abnormal EEG Study   -severe diffuse attenuation  -----------------------------------------------------------------------------------------------------    Clinical Impression:  There were no epileptiform abnormalities recorded.  Severe cerebral dysfunction evident.      -------------------------------------------------------------------------------------------------------  Best Claire M.D.   of Neurology, [facility] [place]

## 2018-06-27 LAB
-  CEFAZOLIN: SIGNIFICANT CHANGE UP
-  CEFOXITIN: SIGNIFICANT CHANGE UP
-  CIPROFLOXACIN: SIGNIFICANT CHANGE UP
-  CLINDAMYCIN: SIGNIFICANT CHANGE UP
-  DAPTOMYCIN: SIGNIFICANT CHANGE UP
-  ERYTHROMYCIN: SIGNIFICANT CHANGE UP
-  GENTAMICIN: SIGNIFICANT CHANGE UP
-  LEVOFLOXACIN: SIGNIFICANT CHANGE UP
-  LINEZOLID: SIGNIFICANT CHANGE UP
-  MOXIFLOXACIN(AEROBIC): SIGNIFICANT CHANGE UP
-  OXACILLIN: SIGNIFICANT CHANGE UP
-  PENICILLIN: SIGNIFICANT CHANGE UP
-  RIFAMPIN.: SIGNIFICANT CHANGE UP
-  TETRACYCLINE: SIGNIFICANT CHANGE UP
-  TRIMETHOPRIM/SULFAMETHOXAZOLE: SIGNIFICANT CHANGE UP
-  VANCOMYCIN: SIGNIFICANT CHANGE UP
ALBUMIN SERPL ELPH-MCNC: 2.9 G/DL — LOW (ref 3.3–5)
ALP SERPL-CCNC: 175 U/L — HIGH (ref 40–120)
ALP SERPL-CCNC: 176 U/L — HIGH (ref 40–120)
ALP SERPL-CCNC: 193 U/L — HIGH (ref 40–120)
ALT FLD-CCNC: 24 U/L — SIGNIFICANT CHANGE UP (ref 4–33)
ALT FLD-CCNC: 25 U/L — SIGNIFICANT CHANGE UP (ref 4–33)
ALT FLD-CCNC: 25 U/L — SIGNIFICANT CHANGE UP (ref 4–33)
AMYLASE P1 CFR SERPL: 50 U/L — SIGNIFICANT CHANGE UP (ref 25–125)
ANISOCYTOSIS BLD QL: SIGNIFICANT CHANGE UP
APPEARANCE UR: CLEAR — SIGNIFICANT CHANGE UP
APTT BLD: 29.7 SEC — SIGNIFICANT CHANGE UP (ref 27.5–37.4)
APTT BLD: 32.1 SEC — SIGNIFICANT CHANGE UP (ref 27.5–37.4)
AST SERPL-CCNC: 26 U/L — SIGNIFICANT CHANGE UP (ref 4–32)
AST SERPL-CCNC: 29 U/L — SIGNIFICANT CHANGE UP (ref 4–32)
AST SERPL-CCNC: 30 U/L — SIGNIFICANT CHANGE UP (ref 4–32)
BACTERIA # UR AUTO: SIGNIFICANT CHANGE UP
BACTERIA SPT RESP CULT: SIGNIFICANT CHANGE UP
BASE EXCESS BLDA CALC-SCNC: -0.9 MMOL/L — SIGNIFICANT CHANGE UP
BASE EXCESS BLDA CALC-SCNC: -1.1 MMOL/L — SIGNIFICANT CHANGE UP
BASE EXCESS BLDA CALC-SCNC: 0.7 MMOL/L — SIGNIFICANT CHANGE UP
BASE EXCESS BLDA CALC-SCNC: 1.5 MMOL/L — SIGNIFICANT CHANGE UP
BASOPHILS # BLD AUTO: 0.02 K/UL — SIGNIFICANT CHANGE UP (ref 0–0.2)
BASOPHILS # BLD AUTO: 0.03 K/UL — SIGNIFICANT CHANGE UP (ref 0–0.2)
BASOPHILS NFR BLD AUTO: 0.1 % — SIGNIFICANT CHANGE UP (ref 0–2)
BASOPHILS NFR BLD AUTO: 0.2 % — SIGNIFICANT CHANGE UP (ref 0–2)
BASOPHILS NFR SPEC: 0 % — SIGNIFICANT CHANGE UP (ref 0–2)
BILIRUB SERPL-MCNC: 0.3 MG/DL — SIGNIFICANT CHANGE UP (ref 0.2–1.2)
BILIRUB SERPL-MCNC: 0.4 MG/DL — SIGNIFICANT CHANGE UP (ref 0.2–1.2)
BILIRUB SERPL-MCNC: 0.4 MG/DL — SIGNIFICANT CHANGE UP (ref 0.2–1.2)
BILIRUB UR-MCNC: NEGATIVE — SIGNIFICANT CHANGE UP
BLASTS # FLD: 0 % — SIGNIFICANT CHANGE UP (ref 0–0)
BLOOD UR QL VISUAL: HIGH
BUN SERPL-MCNC: 22 MG/DL — SIGNIFICANT CHANGE UP (ref 7–23)
BUN SERPL-MCNC: 22 MG/DL — SIGNIFICANT CHANGE UP (ref 7–23)
BUN SERPL-MCNC: 23 MG/DL — SIGNIFICANT CHANGE UP (ref 7–23)
CALCIUM SERPL-MCNC: 9 MG/DL — SIGNIFICANT CHANGE UP (ref 8.4–10.5)
CALCIUM SERPL-MCNC: 9.2 MG/DL — SIGNIFICANT CHANGE UP (ref 8.4–10.5)
CALCIUM SERPL-MCNC: 9.3 MG/DL — SIGNIFICANT CHANGE UP (ref 8.4–10.5)
CHLORIDE BLDA-SCNC: > 120 MMOL/L — HIGH (ref 96–108)
CHLORIDE SERPL-SCNC: 115 MMOL/L — HIGH (ref 98–107)
CHLORIDE SERPL-SCNC: 117 MMOL/L — HIGH (ref 98–107)
CHLORIDE SERPL-SCNC: 118 MMOL/L — HIGH (ref 98–107)
CK MB BLD-MCNC: 4.35 NG/ML — SIGNIFICANT CHANGE UP (ref 1–4.7)
CO2 SERPL-SCNC: 23 MMOL/L — SIGNIFICANT CHANGE UP (ref 22–31)
CO2 SERPL-SCNC: 26 MMOL/L — SIGNIFICANT CHANGE UP (ref 22–31)
CO2 SERPL-SCNC: 26 MMOL/L — SIGNIFICANT CHANGE UP (ref 22–31)
COLOR SPEC: YELLOW — SIGNIFICANT CHANGE UP
CREAT SERPL-MCNC: 1.31 MG/DL — HIGH (ref 0.5–1.3)
CREAT SERPL-MCNC: 1.4 MG/DL — HIGH (ref 0.5–1.3)
CREAT SERPL-MCNC: 1.41 MG/DL — HIGH (ref 0.5–1.3)
ELLIPTOCYTES BLD QL SMEAR: SLIGHT — SIGNIFICANT CHANGE UP
EOSINOPHIL # BLD AUTO: 0.01 K/UL — SIGNIFICANT CHANGE UP (ref 0–0.5)
EOSINOPHIL # BLD AUTO: 0.04 K/UL — SIGNIFICANT CHANGE UP (ref 0–0.5)
EOSINOPHIL NFR BLD AUTO: 0.1 % — SIGNIFICANT CHANGE UP (ref 0–6)
EOSINOPHIL NFR BLD AUTO: 0.2 % — SIGNIFICANT CHANGE UP (ref 0–6)
EOSINOPHIL NFR FLD: 0 % — SIGNIFICANT CHANGE UP (ref 0–6)
GGT SERPL-CCNC: 139 U/L — HIGH (ref 5–36)
GGT SERPL-CCNC: 141 U/L — HIGH (ref 5–36)
GIANT PLATELETS BLD QL SMEAR: PRESENT — SIGNIFICANT CHANGE UP
GLUCOSE BLDA-MCNC: 237 MG/DL — HIGH (ref 70–99)
GLUCOSE BLDC GLUCOMTR-MCNC: 197 MG/DL — HIGH (ref 70–99)
GLUCOSE BLDC GLUCOMTR-MCNC: 277 MG/DL — HIGH (ref 70–99)
GLUCOSE SERPL-MCNC: 230 MG/DL — HIGH (ref 70–99)
GLUCOSE SERPL-MCNC: 242 MG/DL — HIGH (ref 70–99)
GLUCOSE SERPL-MCNC: 338 MG/DL — HIGH (ref 70–99)
GLUCOSE UR-MCNC: >1000 — SIGNIFICANT CHANGE UP
GRAM STN SPT: SIGNIFICANT CHANGE UP
HCO3 BLDA-SCNC: 23 MMOL/L — SIGNIFICANT CHANGE UP (ref 22–26)
HCO3 BLDA-SCNC: 24 MMOL/L — SIGNIFICANT CHANGE UP (ref 22–26)
HCO3 BLDA-SCNC: 25 MMOL/L — SIGNIFICANT CHANGE UP (ref 22–26)
HCO3 BLDA-SCNC: 26 MMOL/L — SIGNIFICANT CHANGE UP (ref 22–26)
HCT VFR BLD CALC: 36.5 % — SIGNIFICANT CHANGE UP (ref 34.5–45)
HCT VFR BLD CALC: 37.3 % — SIGNIFICANT CHANGE UP (ref 34.5–45)
HCT VFR BLD CALC: 41.1 % — SIGNIFICANT CHANGE UP (ref 34.5–45)
HCT VFR BLDA CALC: 36 % — SIGNIFICANT CHANGE UP (ref 34.5–46.5)
HGB BLD-MCNC: 10.3 G/DL — LOW (ref 11.5–15.5)
HGB BLD-MCNC: 11.1 G/DL — LOW (ref 11.5–15.5)
HGB BLD-MCNC: 9.9 G/DL — LOW (ref 11.5–15.5)
HGB BLDA-MCNC: 11.7 G/DL — SIGNIFICANT CHANGE UP (ref 11.5–15.5)
HYPOCHROMIA BLD QL: SLIGHT — SIGNIFICANT CHANGE UP
IMM GRANULOCYTES # BLD AUTO: 0.05 # — SIGNIFICANT CHANGE UP
IMM GRANULOCYTES # BLD AUTO: 0.09 # — SIGNIFICANT CHANGE UP
IMM GRANULOCYTES NFR BLD AUTO: 0.3 % — SIGNIFICANT CHANGE UP (ref 0–1.5)
IMM GRANULOCYTES NFR BLD AUTO: 0.7 % — SIGNIFICANT CHANGE UP (ref 0–1.5)
INR BLD: 1.13 — SIGNIFICANT CHANGE UP (ref 0.88–1.17)
KETONES UR-MCNC: SIGNIFICANT CHANGE UP
L PNEUMO AG UR QL: NEGATIVE — SIGNIFICANT CHANGE UP
LACTATE BLDA-SCNC: 1.6 MMOL/L — SIGNIFICANT CHANGE UP (ref 0.5–2)
LDH SERPL L TO P-CCNC: 615 U/L — HIGH (ref 135–225)
LEUKOCYTE ESTERASE UR-ACNC: NEGATIVE — SIGNIFICANT CHANGE UP
LIDOCAIN IGE QN: 36.8 U/L — SIGNIFICANT CHANGE UP (ref 7–60)
LYMPHOCYTES # BLD AUTO: 0.65 K/UL — LOW (ref 1–3.3)
LYMPHOCYTES # BLD AUTO: 13.1 % — SIGNIFICANT CHANGE UP (ref 13–44)
LYMPHOCYTES # BLD AUTO: 2.24 K/UL — SIGNIFICANT CHANGE UP (ref 1–3.3)
LYMPHOCYTES # BLD AUTO: 4.8 % — LOW (ref 13–44)
LYMPHOCYTES NFR SPEC AUTO: 1.7 % — LOW (ref 13–44)
MAGNESIUM SERPL-MCNC: 2.3 MG/DL — SIGNIFICANT CHANGE UP (ref 1.6–2.6)
MAGNESIUM SERPL-MCNC: 2.4 MG/DL — SIGNIFICANT CHANGE UP (ref 1.6–2.6)
MAGNESIUM SERPL-MCNC: 2.5 MG/DL — SIGNIFICANT CHANGE UP (ref 1.6–2.6)
MCHC RBC-ENTMCNC: 17.2 PG — LOW (ref 27–34)
MCHC RBC-ENTMCNC: 17.3 PG — LOW (ref 27–34)
MCHC RBC-ENTMCNC: 17.4 PG — LOW (ref 27–34)
MCHC RBC-ENTMCNC: 27 % — LOW (ref 32–36)
MCHC RBC-ENTMCNC: 27.1 % — LOW (ref 32–36)
MCHC RBC-ENTMCNC: 27.6 % — LOW (ref 32–36)
MCV RBC AUTO: 62.9 FL — LOW (ref 80–100)
MCV RBC AUTO: 63.6 FL — LOW (ref 80–100)
MCV RBC AUTO: 64.2 FL — LOW (ref 80–100)
METAMYELOCYTES # FLD: 0 % — SIGNIFICANT CHANGE UP (ref 0–1)
METHOD TYPE: SIGNIFICANT CHANGE UP
MICROCYTES BLD QL: SIGNIFICANT CHANGE UP
MONOCYTES # BLD AUTO: 0.31 K/UL — SIGNIFICANT CHANGE UP (ref 0–0.9)
MONOCYTES # BLD AUTO: 2.28 K/UL — HIGH (ref 0–0.9)
MONOCYTES NFR BLD AUTO: 13.3 % — SIGNIFICANT CHANGE UP (ref 2–14)
MONOCYTES NFR BLD AUTO: 2.3 % — SIGNIFICANT CHANGE UP (ref 2–14)
MONOCYTES NFR BLD: 0 % — LOW (ref 2–9)
MUCOUS THREADS # UR AUTO: SIGNIFICANT CHANGE UP
MYELOCYTES NFR BLD: 0 % — SIGNIFICANT CHANGE UP (ref 0–0)
NEUTROPHIL AB SER-ACNC: 97.4 % — HIGH (ref 43–77)
NEUTROPHILS # BLD AUTO: 12.46 K/UL — HIGH (ref 1.8–7.4)
NEUTROPHILS # BLD AUTO: 12.58 K/UL — HIGH (ref 1.8–7.4)
NEUTROPHILS NFR BLD AUTO: 72.9 % — SIGNIFICANT CHANGE UP (ref 43–77)
NEUTROPHILS NFR BLD AUTO: 92 % — HIGH (ref 43–77)
NEUTS BAND # BLD: 0 % — SIGNIFICANT CHANGE UP (ref 0–6)
NITRITE UR-MCNC: NEGATIVE — SIGNIFICANT CHANGE UP
NRBC # FLD: 0.02 — SIGNIFICANT CHANGE UP
ORGANISM # SPEC MICROSCOPIC CNT: SIGNIFICANT CHANGE UP
ORGANISM # SPEC MICROSCOPIC CNT: SIGNIFICANT CHANGE UP
OSMOLALITY UR: 343 MOSMO/KG — SIGNIFICANT CHANGE UP (ref 50–1200)
OTHER - HEMATOLOGY %: 0 — SIGNIFICANT CHANGE UP
PCO2 BLDA: 41 MMHG — SIGNIFICANT CHANGE UP (ref 32–48)
PCO2 BLDA: 44 MMHG — SIGNIFICANT CHANGE UP (ref 32–48)
PCO2 BLDA: 45 MMHG — SIGNIFICANT CHANGE UP (ref 32–48)
PCO2 BLDA: 48 MMHG — SIGNIFICANT CHANGE UP (ref 32–48)
PH BLDA: 7.35 PH — SIGNIFICANT CHANGE UP (ref 7.35–7.45)
PH BLDA: 7.35 PH — SIGNIFICANT CHANGE UP (ref 7.35–7.45)
PH BLDA: 7.37 PH — SIGNIFICANT CHANGE UP (ref 7.35–7.45)
PH BLDA: 7.38 PH — SIGNIFICANT CHANGE UP (ref 7.35–7.45)
PH UR: 6.5 — SIGNIFICANT CHANGE UP (ref 4.6–8)
PHOSPHATE SERPL-MCNC: 2.1 MG/DL — LOW (ref 2.5–4.5)
PHOSPHATE SERPL-MCNC: 2.4 MG/DL — LOW (ref 2.5–4.5)
PHOSPHATE SERPL-MCNC: 3.2 MG/DL — SIGNIFICANT CHANGE UP (ref 2.5–4.5)
PLATELET # BLD AUTO: 286 K/UL — SIGNIFICANT CHANGE UP (ref 150–400)
PLATELET # BLD AUTO: 301 K/UL — SIGNIFICANT CHANGE UP (ref 150–400)
PLATELET # BLD AUTO: 317 K/UL — SIGNIFICANT CHANGE UP (ref 150–400)
PLATELET COUNT - ESTIMATE: NORMAL — SIGNIFICANT CHANGE UP
PMV BLD: SIGNIFICANT CHANGE UP FL (ref 7–13)
PO2 BLDA: 120 MMHG — HIGH (ref 83–108)
PO2 BLDA: 131 MMHG — HIGH (ref 83–108)
PO2 BLDA: 209 MMHG — HIGH (ref 83–108)
PO2 BLDA: 233 MMHG — HIGH (ref 83–108)
POIKILOCYTOSIS BLD QL AUTO: SIGNIFICANT CHANGE UP
POLYCHROMASIA BLD QL SMEAR: SLIGHT — SIGNIFICANT CHANGE UP
POTASSIUM BLDA-SCNC: 3.5 MMOL/L — SIGNIFICANT CHANGE UP (ref 3.4–4.5)
POTASSIUM SERPL-MCNC: 3.6 MMOL/L — SIGNIFICANT CHANGE UP (ref 3.5–5.3)
POTASSIUM SERPL-MCNC: 4.1 MMOL/L — SIGNIFICANT CHANGE UP (ref 3.5–5.3)
POTASSIUM SERPL-MCNC: 4.7 MMOL/L — SIGNIFICANT CHANGE UP (ref 3.5–5.3)
POTASSIUM SERPL-SCNC: 3.6 MMOL/L — SIGNIFICANT CHANGE UP (ref 3.5–5.3)
POTASSIUM SERPL-SCNC: 4.1 MMOL/L — SIGNIFICANT CHANGE UP (ref 3.5–5.3)
POTASSIUM SERPL-SCNC: 4.7 MMOL/L — SIGNIFICANT CHANGE UP (ref 3.5–5.3)
PROMYELOCYTES # FLD: 0 % — SIGNIFICANT CHANGE UP (ref 0–0)
PROT SERPL-MCNC: 6.2 G/DL — SIGNIFICANT CHANGE UP (ref 6–8.3)
PROT SERPL-MCNC: 6.4 G/DL — SIGNIFICANT CHANGE UP (ref 6–8.3)
PROT SERPL-MCNC: 6.7 G/DL — SIGNIFICANT CHANGE UP (ref 6–8.3)
PROT UR-MCNC: 30 MG/DL — HIGH
PROTHROM AB SERPL-ACNC: 12.6 SEC — SIGNIFICANT CHANGE UP (ref 9.8–13.1)
RBC # BLD: 5.74 M/UL — HIGH (ref 3.8–5.2)
RBC # BLD: 5.93 M/UL — HIGH (ref 3.8–5.2)
RBC # BLD: 6.4 M/UL — HIGH (ref 3.8–5.2)
RBC # FLD: 23.3 % — HIGH (ref 10.3–14.5)
RBC # FLD: 23.3 % — HIGH (ref 10.3–14.5)
RBC # FLD: 23.5 % — HIGH (ref 10.3–14.5)
RBC CASTS # UR COMP ASSIST: SIGNIFICANT CHANGE UP (ref 0–?)
RH IG SCN BLD-IMP: POSITIVE — SIGNIFICANT CHANGE UP
SAO2 % BLDA: 97.2 % — SIGNIFICANT CHANGE UP (ref 95–99)
SAO2 % BLDA: 98 % — SIGNIFICANT CHANGE UP (ref 95–99)
SAO2 % BLDA: 98.3 % — SIGNIFICANT CHANGE UP (ref 95–99)
SAO2 % BLDA: 98.4 % — SIGNIFICANT CHANGE UP (ref 95–99)
SODIUM BLDA-SCNC: 152 MMOL/L — HIGH (ref 136–146)
SODIUM SERPL-SCNC: 152 MMOL/L — HIGH (ref 135–145)
SODIUM SERPL-SCNC: 153 MMOL/L — HIGH (ref 135–145)
SODIUM SERPL-SCNC: 154 MMOL/L — HIGH (ref 135–145)
SP GR SPEC: 1.01 — SIGNIFICANT CHANGE UP (ref 1–1.04)
SQUAMOUS # UR AUTO: SIGNIFICANT CHANGE UP
TARGETS BLD QL SMEAR: SLIGHT — SIGNIFICANT CHANGE UP
TROPONIN T, HIGH SENSITIVITY: 167 NG/L — CRITICAL HIGH (ref ?–14)
UROBILINOGEN FLD QL: NORMAL MG/DL — SIGNIFICANT CHANGE UP
VARIANT LYMPHS # BLD: 0.9 % — SIGNIFICANT CHANGE UP
WBC # BLD: 13.66 K/UL — HIGH (ref 3.8–10.5)
WBC # BLD: 17.1 K/UL — HIGH (ref 3.8–10.5)
WBC # BLD: 17.26 K/UL — HIGH (ref 3.8–10.5)
WBC # FLD AUTO: 13.66 K/UL — HIGH (ref 3.8–10.5)
WBC # FLD AUTO: 17.1 K/UL — HIGH (ref 3.8–10.5)
WBC # FLD AUTO: 17.26 K/UL — HIGH (ref 3.8–10.5)
WBC UR QL: HIGH (ref 0–?)

## 2018-06-27 PROCEDURE — 71045 X-RAY EXAM CHEST 1 VIEW: CPT | Mod: 26

## 2018-06-27 PROCEDURE — 31624 DX BRONCHOSCOPE/LAVAGE: CPT

## 2018-06-27 PROCEDURE — 93306 TTE W/DOPPLER COMPLETE: CPT | Mod: 26

## 2018-06-27 PROCEDURE — 76942 ECHO GUIDE FOR BIOPSY: CPT | Mod: 26

## 2018-06-27 PROCEDURE — 47000 NEEDLE BIOPSY OF LIVER PERQ: CPT

## 2018-06-27 PROCEDURE — 99291 CRITICAL CARE FIRST HOUR: CPT | Mod: 25

## 2018-06-27 PROCEDURE — 36620 INSERTION CATHETER ARTERY: CPT

## 2018-06-27 PROCEDURE — 76700 US EXAM ABDOM COMPLETE: CPT | Mod: 26

## 2018-06-27 RX ORDER — ALBUTEROL 90 UG/1
2.5 AEROSOL, METERED ORAL EVERY 6 HOURS
Qty: 0 | Refills: 0 | Status: DISCONTINUED | OUTPATIENT
Start: 2018-06-27 | End: 2018-06-27

## 2018-06-27 RX ORDER — ALBUTEROL 90 UG/1
2 AEROSOL, METERED ORAL EVERY 6 HOURS
Qty: 0 | Refills: 0 | Status: DISCONTINUED | OUTPATIENT
Start: 2018-06-27 | End: 2018-06-28

## 2018-06-27 RX ORDER — SODIUM CHLORIDE 9 MG/ML
1000 INJECTION, SOLUTION INTRAVENOUS
Qty: 0 | Refills: 0 | Status: DISCONTINUED | OUTPATIENT
Start: 2018-06-27 | End: 2018-06-27

## 2018-06-27 RX ORDER — VASOPRESSIN 20 [USP'U]/ML
0.04 INJECTION INTRAVENOUS
Qty: 100 | Refills: 0 | Status: DISCONTINUED | OUTPATIENT
Start: 2018-06-27 | End: 2018-06-28

## 2018-06-27 RX ORDER — SODIUM CHLORIDE 9 MG/ML
1000 INJECTION, SOLUTION INTRAVENOUS
Qty: 0 | Refills: 0 | Status: DISCONTINUED | OUTPATIENT
Start: 2018-06-27 | End: 2018-06-28

## 2018-06-27 RX ORDER — LEVOTHYROXINE SODIUM 125 MCG
20 TABLET ORAL ONCE
Qty: 0 | Refills: 0 | Status: COMPLETED | OUTPATIENT
Start: 2018-06-27 | End: 2018-06-27

## 2018-06-27 RX ORDER — NOREPINEPHRINE BITARTRATE/D5W 8 MG/250ML
0.05 PLASTIC BAG, INJECTION (ML) INTRAVENOUS
Qty: 16 | Refills: 0 | Status: DISCONTINUED | OUTPATIENT
Start: 2018-06-27 | End: 2018-06-28

## 2018-06-27 RX ORDER — VASOPRESSIN 20 [USP'U]/ML
2.4 INJECTION INTRAVENOUS
Qty: 100 | Refills: 0 | Status: DISCONTINUED | OUTPATIENT
Start: 2018-06-27 | End: 2018-06-27

## 2018-06-27 RX ORDER — LEVOTHYROXINE SODIUM 125 MCG
20 TABLET ORAL
Qty: 200 | Refills: 0 | Status: DISCONTINUED | OUTPATIENT
Start: 2018-06-27 | End: 2018-06-28

## 2018-06-27 RX ORDER — PANTOPRAZOLE SODIUM 20 MG/1
40 TABLET, DELAYED RELEASE ORAL DAILY
Qty: 0 | Refills: 0 | Status: DISCONTINUED | OUTPATIENT
Start: 2018-06-27 | End: 2018-06-28

## 2018-06-27 RX ADMIN — PANTOPRAZOLE SODIUM 40 MILLIGRAM(S): 20 TABLET, DELAYED RELEASE ORAL at 11:09

## 2018-06-27 RX ADMIN — Medication 7.23 MICROGRAM(S)/KG/MIN: at 08:13

## 2018-06-27 RX ADMIN — ALBUTEROL 2 PUFF(S): 90 AEROSOL, METERED ORAL at 04:44

## 2018-06-27 RX ADMIN — Medication 4: at 12:00

## 2018-06-27 RX ADMIN — Medication 50 MICROGRAM(S)/HR: at 20:13

## 2018-06-27 RX ADMIN — Medication 6.02 MICROGRAM(S)/KG/MIN: at 20:13

## 2018-06-27 RX ADMIN — AZITHROMYCIN 250 MILLIGRAM(S): 500 TABLET, FILM COATED ORAL at 08:13

## 2018-06-27 RX ADMIN — CHLORHEXIDINE GLUCONATE 15 MILLILITER(S): 213 SOLUTION TOPICAL at 05:19

## 2018-06-27 RX ADMIN — ALBUTEROL 2 PUFF(S): 90 AEROSOL, METERED ORAL at 21:27

## 2018-06-27 RX ADMIN — Medication 10: at 23:50

## 2018-06-27 RX ADMIN — SODIUM CHLORIDE 100 MILLILITER(S): 9 INJECTION, SOLUTION INTRAVENOUS at 11:09

## 2018-06-27 RX ADMIN — Medication 216 MILLIGRAM(S): at 12:10

## 2018-06-27 RX ADMIN — CHLORHEXIDINE GLUCONATE 15 MILLILITER(S): 213 SOLUTION TOPICAL at 19:12

## 2018-06-27 RX ADMIN — ALBUTEROL 2 PUFF(S): 90 AEROSOL, METERED ORAL at 11:03

## 2018-06-27 RX ADMIN — MEROPENEM 100 MILLIGRAM(S): 1 INJECTION INTRAVENOUS at 05:19

## 2018-06-27 RX ADMIN — VASOPRESSIN 0.5 UNIT(S)/HR: 20 INJECTION INTRAVENOUS at 20:13

## 2018-06-27 RX ADMIN — Medication 7.23 MICROGRAM(S)/KG/MIN: at 02:56

## 2018-06-27 RX ADMIN — MEROPENEM 100 MILLIGRAM(S): 1 INJECTION INTRAVENOUS at 22:06

## 2018-06-27 RX ADMIN — CHLORHEXIDINE GLUCONATE 1 APPLICATION(S): 213 SOLUTION TOPICAL at 11:20

## 2018-06-27 RX ADMIN — MEROPENEM 100 MILLIGRAM(S): 1 INJECTION INTRAVENOUS at 14:00

## 2018-06-27 RX ADMIN — SODIUM CHLORIDE 100 MILLILITER(S): 9 INJECTION, SOLUTION INTRAVENOUS at 11:20

## 2018-06-27 RX ADMIN — Medication 2: at 05:54

## 2018-06-27 RX ADMIN — VASOPRESSIN 0.5 UNIT(S)/HR: 20 INJECTION INTRAVENOUS at 19:14

## 2018-06-27 RX ADMIN — Medication 1 APPLICATION(S): at 05:19

## 2018-06-27 RX ADMIN — Medication 1 APPLICATION(S): at 18:00

## 2018-06-27 RX ADMIN — SODIUM CHLORIDE 100 MILLILITER(S): 9 INJECTION, SOLUTION INTRAVENOUS at 20:13

## 2018-06-27 RX ADMIN — Medication 20 MICROGRAM(S): at 12:09

## 2018-06-27 RX ADMIN — HEPARIN SODIUM 5000 UNIT(S): 5000 INJECTION INTRAVENOUS; SUBCUTANEOUS at 05:19

## 2018-06-27 RX ADMIN — Medication 50 MICROGRAM(S)/HR: at 19:14

## 2018-06-27 RX ADMIN — Medication 6: at 18:00

## 2018-06-27 NOTE — PROGRESS NOTE ADULT - ASSESSMENT
67F from [facility] with HTN, HLD, T2DM, uterine prolapse complicated by multiple UTIs (last with pansensitive E. coli), spinal stenosis s/p surgery in [facility] in 1990s with residual wheelchair bound status brought in by EMS following PEA arrest s/p ROSC and intubation in the field, likely due to aspiration event now complicated lack of responsiveness to tactile stimuli due to hypoxia and aspiration pneumonia. Patient's hospital course complicated by fever and bacteremia. Patient met criteria for brain death on 6/26    # Neuro: Met criteria for brain death. Possible anoxic brain injury due to hypoxia, was unresponsive to tactile stimuli prior to initiation of sedation  - CT head with no ICH or territorial infarct, suspicious for global hypoxia with subtle loss of gray/white differentiation  - Patient DC'd from sedation, remains unresponsive  - 24h EEG showed no epileptiform findings, but severe cerebral dysfunction  - Patient met criteria for brain death on 6/26    # CV: S/P PEA arrest but CK and high sensitivity troponin, only mildly elevated requiring minimal dosage of levophed due to sedation  - LV function appears preserved on POCUS  - On pressors    # Pulm: On mechanical ventilation, initially intubated for airway protection but also with likely aspiration pneumonia  - Consolidation on POCUS  - RRVP negative  - Sputum culture shows gram positive rods and cocci    # ID: Pt with sepsis due to aspiration pneumonia, WBC 22  - D/C zosyn (received 6/24-6/25)  - Received vancomycin x2 (6/24, 6/25)  - Blood cultures show gram negative rods, PCR+ E. coli  - Started on meropenem 6/26  - Urine cultures negative, but patient has chronic recurrent UTIs and was treated with ciprofloxacin recently, possible AB resistance    # GI: Pt with likely dysphagia but now comatose  - Holding PPI for now  - Ordered glucerna tube feeds     # : Pt with recurrent UTIs due to uterine prolapse, reportedly self catheterizes but unclear if actually has urinary retention as on oxybutynin. Per pt's daughter, self catheterization is for urgency.  - UA negative, but started a course of cipro 1 day ago  - Holding oxybutynin    # Endocrine: T2DM  - Check HgbA1c  - ISS  - Holding victoza    # Heme: Anemia to 9.6 with baseline in 10s, microcytic, iron studies consistent with iron deficiency anemia  - Continue iron tabs  - Check iron studies, ferritin, retics    Patient met criteria for brain death 6/26. Patient's family will meet with [facility] in reference to organ donation. 67F from [facility] with HTN, HLD, T2DM, uterine prolapse complicated by multiple UTIs (last with pansensitive E. coli), spinal stenosis s/p surgery in [facility] in 1990s with residual wheelchair bound status brought in by EMS following PEA arrest s/p ROSC and intubation in the field, likely due to aspiration event now complicated lack of responsiveness to tactile stimuli due to hypoxia and aspiration pneumonia. Patient's hospital course complicated by fever and bacteremia. Patient met criteria for brain death on 6/26    # Neuro: Met criteria for brain death. Possible anoxic brain injury due to hypoxia, was unresponsive to tactile stimuli prior to initiation of sedation  - CT head with no ICH or territorial infarct, suspicious for global hypoxia with subtle loss of gray/white differentiation  - Patient DC'd from sedation, remains unresponsive  - 24h EEG showed no epileptiform findings, but severe cerebral dysfunction  - Patient met criteria for brain death on 6/26    # CV: S/P PEA arrest but CK and high sensitivity troponin, only mildly elevated requiring minimal dosage of levophed due to sedation  - LV function appears preserved on POCUS  - On pressors    # Pulm: On mechanical ventilation, initially intubated for airway protection but also with likely aspiration pneumonia  - Consolidation on POCUS  - RRVP negative  - Sputum culture shows gram positive rods and cocci    # ID: Pt with sepsis due to aspiration pneumonia, WBC 22  - D/C zosyn (received 6/24-6/25)  - Received vancomycin x2 (6/24, 6/25)  - Blood cultures show gram negative rods, PCR+ E. 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 Per pt's daughter, self catheterization is for urgency.  - UA negative, but started a course of cipro 1 day ago  - Holding oxybutynin    # Endocrine: T2DM  - Check HgbA1c  - ISS  - Holding victoza    # Heme: Anemia to 9.6 with baseline in 10s, microcytic, iron studies consistent with iron deficiency anemia  - Continue iron tabs  - Check iron studies, ferritin, retics    Patient met criteria for brain death 6/26. Patient's family will meet with [facility] in reference to organ donation. 67F from [facility] with HTN, HLD, T2DM, uterine prolapse complicated by multiple UTIs (last with pansensitive E. coli), spinal stenosis s/p surgery in [facility] in 1990s with residual wheelchair bound status brought in by EMS following PEA arrest s/p ROSC and intubation in the field, likely due to aspiration event now complicated lack of responsiveness to tactile stimuli due to hypoxia and aspiration pneumonia. Patient's hospital course complicated by fever and bacteremia. Patient met criteria for brain death on 6/26    # Neuro: Met criteria for brain death. Possible anoxic brain injury due to hypoxia, was unresponsive to tactile stimuli prior to initiation of sedation  - CT head with no ICH or territorial infarct, suspicious for global hypoxia with subtle loss of gray/white differentiation  - Patient DC'd from sedation, remains unresponsive  - 24h EEG showed no epileptiform findings, but severe cerebral dysfunction  - Patient met criteria for brain death on 6/26    # CV: S/P PEA arrest but CK and high sensitivity troponin, only mildly elevated requiring minimal dosage of levophed due to sedation  - LV function appears preserved on POCUS  - On pressors    # Pulm: On mechanical ventilation, initially intubated for airway protection but also with likely aspiration pneumonia  - Consolidation on POCUS  - RRVP negative  - Sputum culture shows gram positive rods and cocci    # ID: Pt with sepsis due to aspiration pneumonia, WBC 22  - D/C zosyn (received 6/24-6/25)  - Received vancomycin x2 (6/24, 6/25)  - Blood cultures show gram negative rods, PCR+ E. 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 Per pt's daughter, self catheterization is for urgency.  - UA negative, but started a course of cipro 1 day ago  - Holding oxybutynin    # Endocrine: T2DM  - Check HgbA1c  - ISS  - Holding victoza    # Heme: Anemia to 9.6 with baseline in 10s, microcytic, iron studies consistent with iron deficiency anemia  - Continue iron tabs  - Check iron studies, ferritin, retics    Patient met criteria for brain death 6/26. Patient's family spoke with [facility] organ donation representative and agreed to organ donation. Orders placed for organ donation protocol as discussed with [facility] representative

## 2018-06-27 NOTE — DISCHARGE NOTE FOR THE EXPIRED PATIENT - HOSPITAL COURSE
67F from [facility] with HTN, HLD, T2DM, asthma, uterine prolapse complicated by multiple UTIs (last with pansensitive E. coli), spinal stenosis s/p surgery in [facility] in 1990s with residual wheelchair bound status brought in by EMS following PEA arrest s/p ROSC and intubation in the field, likely due to aspiration event. Patient was admitted to the MICU on 6/25, requiring mechanical ventilation and IV pressor support. She was treated with broad spectrum antibiotics and cultured. Sputum cultures grew MRSA. Initial CT head concerning for hypoxic brain injury. Sedation weaned, and patient noted to lack corneal and gag reflexes. Apnea test performed, no spontaneous breathing noted, with rise of pCO2 > 20 mmHg. No reversible causes of coma identified. Blood pressure was acceptable and core temperature above 36 degrees C. The patient noted to have absence of response to pain in all 4 limbs. Pupils were non-reactive with absent oculocephalic reflex. No deviation of eyes noted to cold caloric testing. Patient pronounced brain dead on 6/26/2018 at 17:44. Attending notified, next of kin notified. Decision made by family to proceed with organ donation. [facility] [place] notified.

## 2018-06-27 NOTE — PROCEDURE NOTE - NSPOSTPRCRAD_GEN_A_CORE
post-procedure radiography performed
central line located in the superior vena cava/no pneumothorax/post-procedure radiography performed

## 2018-06-27 NOTE — DIETITIAN INITIAL EVALUATION ADULT. - OTHER INFO
Pt. continues to receive Glucerna 1.2 via OGT @ goal of 50mL/hr x 24hrs with reportedly good tolerance.  Of note, per chart review and discussion w RN, Pt. currently meets criteria for brain death.  Thus, no further acute nutritional intervention deemed necessary at this time.

## 2018-06-27 NOTE — CHART NOTE - NSCHARTNOTEFT_GEN_A_CORE
Indication: Potential Lung Transplant Donor    Operators: Leonela Luna/Derick Bolton    Findings:  Bronchoscope inserted through ETT. ETT noted to be in good position. Airway inspected bilaterally to subsegments. Airway evaluation revealed no endobronchial lesions. Purulent mucous suctioned from RML and RLL successfully. Bronchial lavage performed x2 with 10-20mL normal saline. Bronchoscope then withdrawn from ETT. Pt tolerated procedure well.    Specimens: Bacteria and Fungal Culture, Gram Stain

## 2018-06-27 NOTE — DIETITIAN INITIAL EVALUATION ADULT. - PERTINENT LABORATORY DATA
06-27 Na152 mmol/L<H> Glu 230 mg/dL<H> K+ 3.6 mmol/L Cr  1.31 mg/dL<H> BUN 22 mg/dL 06-27 Phos 2.1 mg/dL<L> 06-27 Alb 2.9 g/dL<L> 06-25 [id number] 6.0 %<H>

## 2018-06-28 VITALS — OXYGEN SATURATION: 94 %

## 2018-06-28 LAB
-  AMIKACIN: SIGNIFICANT CHANGE UP
-  AMPICILLIN/SULBACTAM: SIGNIFICANT CHANGE UP
-  AMPICILLIN: SIGNIFICANT CHANGE UP
-  AZTREONAM: SIGNIFICANT CHANGE UP
-  CEFAZOLIN: SIGNIFICANT CHANGE UP
-  CEFEPIME: SIGNIFICANT CHANGE UP
-  CEFOXITIN: SIGNIFICANT CHANGE UP
-  CEFTAZIDIME: SIGNIFICANT CHANGE UP
-  CEFTRIAXONE: SIGNIFICANT CHANGE UP
-  CIPROFLOXACIN: SIGNIFICANT CHANGE UP
-  ERTAPENEM: SIGNIFICANT CHANGE UP
-  GENTAMICIN: SIGNIFICANT CHANGE UP
-  IMIPENEM: SIGNIFICANT CHANGE UP
-  LEVOFLOXACIN: SIGNIFICANT CHANGE UP
-  MEROPENEM: SIGNIFICANT CHANGE UP
-  PIPERACILLIN/TAZOBACTAM: SIGNIFICANT CHANGE UP
-  TIGECYCLINE: SIGNIFICANT CHANGE UP
-  TOBRAMYCIN: SIGNIFICANT CHANGE UP
-  TRIMETHOPRIM/SULFAMETHOXAZOLE: SIGNIFICANT CHANGE UP
ALBUMIN SERPL ELPH-MCNC: 2.4 G/DL — LOW (ref 3.3–5)
ALBUMIN SERPL ELPH-MCNC: 2.4 G/DL — LOW (ref 3.3–5)
ALBUMIN SERPL ELPH-MCNC: 2.5 G/DL — LOW (ref 3.3–5)
ALBUMIN SERPL ELPH-MCNC: 2.5 G/DL — LOW (ref 3.3–5)
ALBUMIN SERPL ELPH-MCNC: 2.7 G/DL — LOW (ref 3.3–5)
ALBUMIN SERPL ELPH-MCNC: 2.7 G/DL — LOW (ref 3.3–5)
ALP SERPL-CCNC: 126 U/L — HIGH (ref 40–120)
ALP SERPL-CCNC: 127 U/L — HIGH (ref 40–120)
ALP SERPL-CCNC: 137 U/L — HIGH (ref 40–120)
ALP SERPL-CCNC: 137 U/L — HIGH (ref 40–120)
ALP SERPL-CCNC: 152 U/L — HIGH (ref 40–120)
ALP SERPL-CCNC: 155 U/L — HIGH (ref 40–120)
ALT FLD-CCNC: 19 U/L — SIGNIFICANT CHANGE UP (ref 4–33)
ALT FLD-CCNC: 20 U/L — SIGNIFICANT CHANGE UP (ref 4–33)
ALT FLD-CCNC: 22 U/L — SIGNIFICANT CHANGE UP (ref 4–33)
AMYLASE P1 CFR SERPL: 16 U/L — LOW (ref 25–125)
AMYLASE P1 CFR SERPL: 19 U/L — LOW (ref 25–125)
AMYLASE P1 CFR SERPL: 28 U/L — SIGNIFICANT CHANGE UP (ref 25–125)
APPEARANCE UR: CLEAR — SIGNIFICANT CHANGE UP
APTT BLD: 26 SEC — LOW (ref 27.5–37.4)
APTT BLD: 28.6 SEC — SIGNIFICANT CHANGE UP (ref 27.5–37.4)
APTT BLD: 30.3 SEC — SIGNIFICANT CHANGE UP (ref 27.5–37.4)
AST SERPL-CCNC: 14 U/L — SIGNIFICANT CHANGE UP (ref 4–32)
AST SERPL-CCNC: 15 U/L — SIGNIFICANT CHANGE UP (ref 4–32)
AST SERPL-CCNC: 18 U/L — SIGNIFICANT CHANGE UP (ref 4–32)
AST SERPL-CCNC: 18 U/L — SIGNIFICANT CHANGE UP (ref 4–32)
AST SERPL-CCNC: 20 U/L — SIGNIFICANT CHANGE UP (ref 4–32)
AST SERPL-CCNC: 22 U/L — SIGNIFICANT CHANGE UP (ref 4–32)
BACTERIA # UR AUTO: SIGNIFICANT CHANGE UP
BACTERIA BLD CULT: SIGNIFICANT CHANGE UP
BASE EXCESS BLDA CALC-SCNC: -0.5 MMOL/L — SIGNIFICANT CHANGE UP
BASE EXCESS BLDA CALC-SCNC: -0.7 MMOL/L — SIGNIFICANT CHANGE UP
BASOPHILS # BLD AUTO: 0.01 K/UL — SIGNIFICANT CHANGE UP (ref 0–0.2)
BASOPHILS NFR BLD AUTO: 0.1 % — SIGNIFICANT CHANGE UP (ref 0–2)
BILIRUB DIRECT SERPL-MCNC: 0.1 MG/DL — SIGNIFICANT CHANGE UP (ref 0.1–0.2)
BILIRUB DIRECT SERPL-MCNC: 0.1 MG/DL — SIGNIFICANT CHANGE UP (ref 0.1–0.2)
BILIRUB DIRECT SERPL-MCNC: 0.2 MG/DL — SIGNIFICANT CHANGE UP (ref 0.1–0.2)
BILIRUB SERPL-MCNC: 0.2 MG/DL — SIGNIFICANT CHANGE UP (ref 0.2–1.2)
BILIRUB SERPL-MCNC: 0.3 MG/DL — SIGNIFICANT CHANGE UP (ref 0.2–1.2)
BILIRUB UR-MCNC: NEGATIVE — SIGNIFICANT CHANGE UP
BLOOD UR QL VISUAL: HIGH
BUN SERPL-MCNC: 23 MG/DL — SIGNIFICANT CHANGE UP (ref 7–23)
BUN SERPL-MCNC: 24 MG/DL — HIGH (ref 7–23)
BUN SERPL-MCNC: 27 MG/DL — HIGH (ref 7–23)
CALCIUM SERPL-MCNC: 8.6 MG/DL — SIGNIFICANT CHANGE UP (ref 8.4–10.5)
CALCIUM SERPL-MCNC: 8.8 MG/DL — SIGNIFICANT CHANGE UP (ref 8.4–10.5)
CALCIUM SERPL-MCNC: 8.9 MG/DL — SIGNIFICANT CHANGE UP (ref 8.4–10.5)
CHLORIDE SERPL-SCNC: 114 MMOL/L — HIGH (ref 98–107)
CHLORIDE SERPL-SCNC: 114 MMOL/L — HIGH (ref 98–107)
CHLORIDE SERPL-SCNC: 115 MMOL/L — HIGH (ref 98–107)
CK MB BLD-MCNC: 1.8 — SIGNIFICANT CHANGE UP (ref 0–2.5)
CK MB BLD-MCNC: 3.01 NG/ML — SIGNIFICANT CHANGE UP (ref 1–4.7)
CK MB BLD-MCNC: 3.05 NG/ML — SIGNIFICANT CHANGE UP (ref 1–4.7)
CK MB BLD-MCNC: 3.24 NG/ML — SIGNIFICANT CHANGE UP (ref 1–4.7)
CK SERPL-CCNC: 118 U/L — SIGNIFICANT CHANGE UP (ref 25–170)
CK SERPL-CCNC: 152 U/L — SIGNIFICANT CHANGE UP (ref 25–170)
CK SERPL-CCNC: 180 U/L — HIGH (ref 25–170)
CO2 SERPL-SCNC: 21 MMOL/L — LOW (ref 22–31)
CO2 SERPL-SCNC: 22 MMOL/L — SIGNIFICANT CHANGE UP (ref 22–31)
CO2 SERPL-SCNC: 25 MMOL/L — SIGNIFICANT CHANGE UP (ref 22–31)
COLOR SPEC: YELLOW — SIGNIFICANT CHANGE UP
CREAT SERPL-MCNC: 1.3 MG/DL — SIGNIFICANT CHANGE UP (ref 0.5–1.3)
CREAT SERPL-MCNC: 1.32 MG/DL — HIGH (ref 0.5–1.3)
CREAT SERPL-MCNC: 1.37 MG/DL — HIGH (ref 0.5–1.3)
CULTURE - ACID FAST SMEAR CONCENTRATED: SIGNIFICANT CHANGE UP
E COLI DNA BLD POS QL NAA+NON-PROBE: SIGNIFICANT CHANGE UP
EOSINOPHIL # BLD AUTO: 0 K/UL — SIGNIFICANT CHANGE UP (ref 0–0.5)
EOSINOPHIL NFR BLD AUTO: 0 % — SIGNIFICANT CHANGE UP (ref 0–6)
GGT SERPL-CCNC: 101 U/L — HIGH (ref 5–36)
GGT SERPL-CCNC: 115 U/L — HIGH (ref 5–36)
GGT SERPL-CCNC: 133 U/L — HIGH (ref 5–36)
GLUCOSE BLDC GLUCOMTR-MCNC: 363 MG/DL — HIGH (ref 70–99)
GLUCOSE BLDC GLUCOMTR-MCNC: 409 MG/DL — HIGH (ref 70–99)
GLUCOSE SERPL-MCNC: 382 MG/DL — HIGH (ref 70–99)
GLUCOSE SERPL-MCNC: 401 MG/DL — HIGH (ref 70–99)
GLUCOSE SERPL-MCNC: 402 MG/DL — HIGH (ref 70–99)
GLUCOSE UR-MCNC: >1000 — SIGNIFICANT CHANGE UP
GRAM STN SPT: SIGNIFICANT CHANGE UP
GRAM STN SPT: SIGNIFICANT CHANGE UP
HCO3 BLDA-SCNC: 24 MMOL/L — SIGNIFICANT CHANGE UP (ref 22–26)
HCO3 BLDA-SCNC: 24 MMOL/L — SIGNIFICANT CHANGE UP (ref 22–26)
HCT VFR BLD CALC: 27 % — LOW (ref 34.5–45)
HCT VFR BLD CALC: 30.1 % — LOW (ref 34.5–45)
HCT VFR BLD CALC: 33.1 % — LOW (ref 34.5–45)
HGB BLD-MCNC: 7.9 G/DL — LOW (ref 11.5–15.5)
HGB BLD-MCNC: 8.3 G/DL — LOW (ref 11.5–15.5)
HGB BLD-MCNC: 9.2 G/DL — LOW (ref 11.5–15.5)
IMM GRANULOCYTES # BLD AUTO: 0.06 # — SIGNIFICANT CHANGE UP
IMM GRANULOCYTES # BLD AUTO: 0.07 # — SIGNIFICANT CHANGE UP
IMM GRANULOCYTES # BLD AUTO: 0.12 # — SIGNIFICANT CHANGE UP
IMM GRANULOCYTES NFR BLD AUTO: 0.5 % — SIGNIFICANT CHANGE UP (ref 0–1.5)
IMM GRANULOCYTES NFR BLD AUTO: 0.5 % — SIGNIFICANT CHANGE UP (ref 0–1.5)
IMM GRANULOCYTES NFR BLD AUTO: 0.9 % — SIGNIFICANT CHANGE UP (ref 0–1.5)
INR BLD: 1.05 — SIGNIFICANT CHANGE UP (ref 0.88–1.17)
INR BLD: 1.14 — SIGNIFICANT CHANGE UP (ref 0.88–1.17)
INR BLD: 1.17 — SIGNIFICANT CHANGE UP (ref 0.88–1.17)
KETONES UR-MCNC: SIGNIFICANT CHANGE UP
LDH SERPL L TO P-CCNC: 515 U/L — HIGH (ref 135–225)
LDH SERPL L TO P-CCNC: 579 U/L — HIGH (ref 135–225)
LDH SERPL L TO P-CCNC: 630 U/L — HIGH (ref 135–225)
LEUKOCYTE ESTERASE UR-ACNC: NEGATIVE — SIGNIFICANT CHANGE UP
LIDOCAIN IGE QN: 10.7 U/L — SIGNIFICANT CHANGE UP (ref 7–60)
LIDOCAIN IGE QN: 12.3 U/L — SIGNIFICANT CHANGE UP (ref 7–60)
LIDOCAIN IGE QN: 18.2 U/L — SIGNIFICANT CHANGE UP (ref 7–60)
LYMPHOCYTES # BLD AUTO: 0.82 K/UL — LOW (ref 1–3.3)
LYMPHOCYTES # BLD AUTO: 0.84 K/UL — LOW (ref 1–3.3)
LYMPHOCYTES # BLD AUTO: 0.85 K/UL — LOW (ref 1–3.3)
LYMPHOCYTES # BLD AUTO: 6.2 % — LOW (ref 13–44)
LYMPHOCYTES # BLD AUTO: 6.3 % — LOW (ref 13–44)
LYMPHOCYTES # BLD AUTO: 6.5 % — LOW (ref 13–44)
MAGNESIUM SERPL-MCNC: 2.3 MG/DL — SIGNIFICANT CHANGE UP (ref 1.6–2.6)
MAGNESIUM SERPL-MCNC: 2.3 MG/DL — SIGNIFICANT CHANGE UP (ref 1.6–2.6)
MAGNESIUM SERPL-MCNC: 2.4 MG/DL — SIGNIFICANT CHANGE UP (ref 1.6–2.6)
MCHC RBC-ENTMCNC: 17.4 PG — LOW (ref 27–34)
MCHC RBC-ENTMCNC: 17.6 PG — LOW (ref 27–34)
MCHC RBC-ENTMCNC: 17.6 PG — LOW (ref 27–34)
MCHC RBC-ENTMCNC: 27.6 % — LOW (ref 32–36)
MCHC RBC-ENTMCNC: 27.8 % — LOW (ref 32–36)
MCHC RBC-ENTMCNC: 29.3 % — LOW (ref 32–36)
MCV RBC AUTO: 60.3 FL — LOW (ref 80–100)
MCV RBC AUTO: 63 FL — LOW (ref 80–100)
MCV RBC AUTO: 63.3 FL — LOW (ref 80–100)
METHOD TYPE: SIGNIFICANT CHANGE UP
MONOCYTES # BLD AUTO: 0.34 K/UL — SIGNIFICANT CHANGE UP (ref 0–0.9)
MONOCYTES # BLD AUTO: 0.51 K/UL — SIGNIFICANT CHANGE UP (ref 0–0.9)
MONOCYTES # BLD AUTO: 0.63 K/UL — SIGNIFICANT CHANGE UP (ref 0–0.9)
MONOCYTES NFR BLD AUTO: 2.5 % — SIGNIFICANT CHANGE UP (ref 2–14)
MONOCYTES NFR BLD AUTO: 3.9 % — SIGNIFICANT CHANGE UP (ref 2–14)
MONOCYTES NFR BLD AUTO: 4.9 % — SIGNIFICANT CHANGE UP (ref 2–14)
MUCOUS THREADS # UR AUTO: SIGNIFICANT CHANGE UP
NEUTROPHILS # BLD AUTO: 11.3 K/UL — HIGH (ref 1.8–7.4)
NEUTROPHILS # BLD AUTO: 11.65 K/UL — HIGH (ref 1.8–7.4)
NEUTROPHILS # BLD AUTO: 12.4 K/UL — HIGH (ref 1.8–7.4)
NEUTROPHILS NFR BLD AUTO: 87.6 % — HIGH (ref 43–77)
NEUTROPHILS NFR BLD AUTO: 89.2 % — HIGH (ref 43–77)
NEUTROPHILS NFR BLD AUTO: 90.7 % — HIGH (ref 43–77)
NITRITE UR-MCNC: NEGATIVE — SIGNIFICANT CHANGE UP
NON-SQ EPI CELLS # UR AUTO: <1 — SIGNIFICANT CHANGE UP
NRBC # FLD: 0 — SIGNIFICANT CHANGE UP
NRBC # FLD: 0 — SIGNIFICANT CHANGE UP
NRBC # FLD: 0.02 — SIGNIFICANT CHANGE UP
PCO2 BLDA: 38 MMHG — SIGNIFICANT CHANGE UP (ref 32–48)
PCO2 BLDA: 44 MMHG — SIGNIFICANT CHANGE UP (ref 32–48)
PH BLDA: 7.36 PH — SIGNIFICANT CHANGE UP (ref 7.35–7.45)
PH BLDA: 7.41 PH — SIGNIFICANT CHANGE UP (ref 7.35–7.45)
PH UR: 6.5 — SIGNIFICANT CHANGE UP (ref 4.6–8)
PHOSPHATE SERPL-MCNC: 2.6 MG/DL — SIGNIFICANT CHANGE UP (ref 2.5–4.5)
PHOSPHATE SERPL-MCNC: 3 MG/DL — SIGNIFICANT CHANGE UP (ref 2.5–4.5)
PHOSPHATE SERPL-MCNC: 3.1 MG/DL — SIGNIFICANT CHANGE UP (ref 2.5–4.5)
PLATELET # BLD AUTO: 185 K/UL — SIGNIFICANT CHANGE UP (ref 150–400)
PLATELET # BLD AUTO: 201 K/UL — SIGNIFICANT CHANGE UP (ref 150–400)
PLATELET # BLD AUTO: 258 K/UL — SIGNIFICANT CHANGE UP (ref 150–400)
PMV BLD: SIGNIFICANT CHANGE UP FL (ref 7–13)
PO2 BLDA: 104 MMHG — SIGNIFICANT CHANGE UP (ref 83–108)
PO2 BLDA: 91 MMHG — SIGNIFICANT CHANGE UP (ref 83–108)
POTASSIUM SERPL-MCNC: 4.1 MMOL/L — SIGNIFICANT CHANGE UP (ref 3.5–5.3)
POTASSIUM SERPL-MCNC: 4.3 MMOL/L — SIGNIFICANT CHANGE UP (ref 3.5–5.3)
POTASSIUM SERPL-MCNC: 4.5 MMOL/L — SIGNIFICANT CHANGE UP (ref 3.5–5.3)
POTASSIUM SERPL-SCNC: 4.1 MMOL/L — SIGNIFICANT CHANGE UP (ref 3.5–5.3)
POTASSIUM SERPL-SCNC: 4.3 MMOL/L — SIGNIFICANT CHANGE UP (ref 3.5–5.3)
POTASSIUM SERPL-SCNC: 4.5 MMOL/L — SIGNIFICANT CHANGE UP (ref 3.5–5.3)
PROT SERPL-MCNC: 5.4 G/DL — LOW (ref 6–8.3)
PROT SERPL-MCNC: 5.5 G/DL — LOW (ref 6–8.3)
PROT SERPL-MCNC: 5.7 G/DL — LOW (ref 6–8.3)
PROT SERPL-MCNC: 5.7 G/DL — LOW (ref 6–8.3)
PROT SERPL-MCNC: 6.1 G/DL — SIGNIFICANT CHANGE UP (ref 6–8.3)
PROT SERPL-MCNC: 6.1 G/DL — SIGNIFICANT CHANGE UP (ref 6–8.3)
PROT UR-MCNC: 30 MG/DL — HIGH
PROTHROM AB SERPL-ACNC: 12.1 SEC — SIGNIFICANT CHANGE UP (ref 9.8–13.1)
PROTHROM AB SERPL-ACNC: 13 SEC — SIGNIFICANT CHANGE UP (ref 9.8–13.1)
PROTHROM AB SERPL-ACNC: 13.2 SEC — HIGH (ref 9.8–13.1)
RBC # BLD: 4.48 M/UL — SIGNIFICANT CHANGE UP (ref 3.8–5.2)
RBC # BLD: 4.78 M/UL — SIGNIFICANT CHANGE UP (ref 3.8–5.2)
RBC # BLD: 5.23 M/UL — HIGH (ref 3.8–5.2)
RBC # FLD: 22.5 % — HIGH (ref 10.3–14.5)
RBC # FLD: 22.7 % — HIGH (ref 10.3–14.5)
RBC # FLD: 22.8 % — HIGH (ref 10.3–14.5)
RBC CASTS # UR COMP ASSIST: SIGNIFICANT CHANGE UP (ref 0–?)
SAO2 % BLDA: 95.5 % — SIGNIFICANT CHANGE UP (ref 95–99)
SAO2 % BLDA: 97.1 % — SIGNIFICANT CHANGE UP (ref 95–99)
SODIUM SERPL-SCNC: 149 MMOL/L — HIGH (ref 135–145)
SODIUM SERPL-SCNC: 151 MMOL/L — HIGH (ref 135–145)
SODIUM SERPL-SCNC: 151 MMOL/L — HIGH (ref 135–145)
SP GR SPEC: 1.02 — SIGNIFICANT CHANGE UP (ref 1–1.04)
SPECIMEN SOURCE: SIGNIFICANT CHANGE UP
SQUAMOUS # UR AUTO: SIGNIFICANT CHANGE UP
TROPONIN T, HIGH SENSITIVITY: 105 NG/L — CRITICAL HIGH (ref ?–14)
TROPONIN T, HIGH SENSITIVITY: 123 NG/L — CRITICAL HIGH (ref ?–14)
UROBILINOGEN FLD QL: NORMAL MG/DL — SIGNIFICANT CHANGE UP
VANCOMYCIN FLD-MCNC: 5.4 UG/ML — SIGNIFICANT CHANGE UP
WBC # BLD: 12.9 K/UL — HIGH (ref 3.8–10.5)
WBC # BLD: 13.05 K/UL — HIGH (ref 3.8–10.5)
WBC # BLD: 13.67 K/UL — HIGH (ref 3.8–10.5)
WBC # FLD AUTO: 12.9 K/UL — HIGH (ref 3.8–10.5)
WBC # FLD AUTO: 13.05 K/UL — HIGH (ref 3.8–10.5)
WBC # FLD AUTO: 13.67 K/UL — HIGH (ref 3.8–10.5)
WBC UR QL: SIGNIFICANT CHANGE UP (ref 0–?)

## 2018-06-28 PROCEDURE — 99291 CRITICAL CARE FIRST HOUR: CPT

## 2018-06-28 PROCEDURE — 36620 INSERTION CATHETER ARTERY: CPT

## 2018-06-28 PROCEDURE — 71045 X-RAY EXAM CHEST 1 VIEW: CPT | Mod: 26

## 2018-06-28 RX ORDER — SODIUM CHLORIDE 9 MG/ML
1000 INJECTION, SOLUTION INTRAVENOUS
Qty: 0 | Refills: 0 | Status: DISCONTINUED | OUTPATIENT
Start: 2018-06-28 | End: 2018-06-28

## 2018-06-28 RX ADMIN — ALBUTEROL 2 PUFF(S): 90 AEROSOL, METERED ORAL at 03:29

## 2018-06-28 RX ADMIN — CHLORHEXIDINE GLUCONATE 15 MILLILITER(S): 213 SOLUTION TOPICAL at 05:04

## 2018-06-28 RX ADMIN — VASOPRESSIN 0.04 UNIT(S)/HR: 20 INJECTION INTRAVENOUS at 07:21

## 2018-06-28 RX ADMIN — ALBUTEROL 2 PUFF(S): 90 AEROSOL, METERED ORAL at 09:02

## 2018-06-28 RX ADMIN — Medication 216 MILLIGRAM(S): at 14:00

## 2018-06-28 RX ADMIN — Medication 2: at 12:00

## 2018-06-28 RX ADMIN — ALBUTEROL 2 PUFF(S): 90 AEROSOL, METERED ORAL at 16:02

## 2018-06-28 RX ADMIN — MEROPENEM 100 MILLIGRAM(S): 1 INJECTION INTRAVENOUS at 14:00

## 2018-06-28 RX ADMIN — CHLORHEXIDINE GLUCONATE 1 APPLICATION(S): 213 SOLUTION TOPICAL at 12:00

## 2018-06-28 RX ADMIN — AZITHROMYCIN 250 MILLIGRAM(S): 500 TABLET, FILM COATED ORAL at 08:00

## 2018-06-28 RX ADMIN — Medication 50 MICROGRAM(S)/HR: at 07:19

## 2018-06-28 RX ADMIN — Medication 12: at 05:04

## 2018-06-28 RX ADMIN — SODIUM CHLORIDE 50 MILLILITER(S): 9 INJECTION, SOLUTION INTRAVENOUS at 07:20

## 2018-06-28 RX ADMIN — Medication 1 APPLICATION(S): at 05:04

## 2018-06-28 RX ADMIN — MEROPENEM 100 MILLIGRAM(S): 1 INJECTION INTRAVENOUS at 05:04

## 2018-06-28 RX ADMIN — PANTOPRAZOLE SODIUM 40 MILLIGRAM(S): 20 TABLET, DELAYED RELEASE ORAL at 12:00

## 2018-06-28 NOTE — PROGRESS NOTE ADULT - SUBJECTIVE AND OBJECTIVE BOX
INTERVAL HPI/OVERNIGHT EVENTS:    SUBJECTIVE: Patient seen and examined at bedside.     Unable to assess ROS as patient is unresponsive    OBJECTIVE:    VITAL SIGNS:  ICU Vital Signs Last 24 Hrs  T(C): 36.5 (2018 07:00), Max: 38.5 (2018 21:00)  T(F): 97.7 (2018 07:00), Max: 101.3 (2018 21:00)  HR: 79 (:00) (79 - 108)  BP: 129/66 (2018 04:00) (100/52 - 158/82)  BP(mean): 81 (2018 04:00) (60 - 95)  ABP: 106/58 (2018 07:00) (101/55 - 150/69)  ABP(mean): 74 (2018 07:00) (71 - 105)  RR: 16 (:00) (15 - 21)  SpO2: 97% (:00) (91% - 100%)    Mode: AC/ CMV (Assist Control/ Continuous Mandatory Ventilation), RR (machine): 16, TV (machine): 450, FiO2: 40, PEEP: 14, MAP: 18, PIP: 30    06-27 @ 07:01  -  - @ 07:00  --------------------------------------------------------  IN: 3283.3 mL / OUT: 2170 mL / NET: 1113.3 mL      CAPILLARY BLOOD GLUCOSE      POCT Blood Glucose.: 409 mg/dL (2018 05:03)      PHYSICAL EXAM:  General: NAD  HEENT: NC/AT; PERRL, clear conjunctiva  Neck: supple  Respiratory: CTA b/l  Cardiovascular: +S1/S2; RRR  Abdomen: soft, NT/ND; +BS x4  Extremities: WWP, 2+ peripheral pulses b/l; no LE edema  Skin: normal color and turgor; no rash  Neurological: Unresponsive    MEDICATIONS:  MEDICATIONS  (STANDING):  ALBUTerol    90 MICROgram(s) HFA Inhaler 2 Puff(s) Inhalation every 6 hours  azithromycin  IVPB      azithromycin  IVPB 500 milliGRAM(s) IV Intermittent every 24 hours  chlorhexidine 0.12% Liquid 15 milliLiter(s) Swish and Spit two times a day  chlorhexidine 4% Liquid 1 Application(s) Topical <User Schedule>  dextrose 5%. 1000 milliLiter(s) (50 mL/Hr) IV Continuous <Continuous>  dextrose 50% Injectable 12.5 Gram(s) IV Push once  dextrose 50% Injectable 25 Gram(s) IV Push once  dextrose 50% Injectable 25 Gram(s) IV Push once  insulin lispro (HumaLOG) corrective regimen sliding scale   SubCutaneous every 6 hours  levothyroxine Infusion 20 MICROgram(s)/Hr (50 mL/Hr) IV Continuous <Continuous>  meropenem  IVPB 1000 milliGRAM(s) IV Intermittent every 8 hours  methylPREDNISolone sodium succinate IVPB 1000 milliGRAM(s) IV Intermittent every 24 hours  norepinephrine Infusion 0.05 MICROgram(s)/kG/Min (6.023 mL/Hr) IV Continuous <Continuous>  pantoprazole  Injectable 40 milliGRAM(s) IV Push daily  petrolatum Ophthalmic Ointment 1 Application(s) Both EYES every 12 hours  sodium chloride 0.45%. 1000 milliLiter(s) (50 mL/Hr) IV Continuous <Continuous>  vasopressin Infusion. 0.04 Unit(s)/Hr (0.04 mL/Hr) IV Continuous <Continuous>    MEDICATIONS  (PRN):  dextrose 40% Gel 15 Gram(s) Oral once PRN Blood Glucose LESS THAN 70 milliGRAM(s)/deciliter  glucagon  Injectable 1 milliGRAM(s) IntraMuscular once PRN Glucose LESS THAN 70 milligrams/deciliter      ALLERGIES:  Allergies    No Known Allergies    Intolerances        LABS:                        9.2    13.67 )-----------( 258      ( 2018 23:30 )             33.1     -    151<H>  |  114<H>  |  23  ----------------------------<  382<H>  4.5   |  21<L>  |  1.37<H>    Ca    8.9      2018 23:33  Phos  3.1     06-  Mg     2.3     -    TPro  6.1  /  Alb  2.7<L>  /  TBili  0.3  /  DBili  x   /  AST  22  /  ALT  20  /  AlkPhos  152<H>  06-27    PT/INR - ( 2018 05:45 )   PT: 13.0 SEC;   INR: 1.17          PTT - ( 2018 05:45 )  PTT:30.3 SEC  Urinalysis Basic - ( 2018 20:45 )    Color: YELLOW / Appearance: CLEAR / S.015 / pH: 6.5  Gluc: >1000 / Ketone: TRACE  / Bili: NEGATIVE / Urobili: NORMAL mg/dL   Blood: MODERATE / Protein: 30 mg/dL / Nitrite: NEGATIVE   Leuk Esterase: NEGATIVE / RBC: 2-5 / WBC 5-10   Sq Epi: OCC / Non Sq Epi: x / Bacteria: FEW        RADIOLOGY & ADDITIONAL TESTS: Reviewed.

## 2018-06-28 NOTE — PROCEDURE NOTE - NSPROCNAME_GEN_A_CORE
Arterial Puncture/Cannulation
Arterial Puncture/Cannulation
Central Line Insertion
Gastric Intubation/Gastric Lavage

## 2018-06-28 NOTE — PROGRESS NOTE ADULT - ATTENDING COMMENTS
Unfortunate case/patient may be anoxic brain injury after cardiac arrest  may need to perform apnea test
remians on schedule for organ donation/remians on vasopressors/abx for BP support/e. coli and MRSA in sputum/very close monitoring
brain death protocol done wnd patient is "brain dead", she will likely be an organ donator, numerous tests need to be performed

## 2018-06-28 NOTE — PROCEDURE NOTE - ADDITIONAL PROCEDURE DETAILS
Previous Left radial A-line no longer working.  Attempted to exchange over wire but appeared occluded.  New left femoral A line placed under US guidance.

## 2018-06-28 NOTE — PROCEDURE NOTE - NSPROCDETAILS_GEN_ALL_CORE
placement confirmed by auscultation/orogastric
lumen(s) aspirated and flushed/sterile dressing applied/sterile technique, catheter placed/ultrasound guidance/guidewire recovered
positive blood return obtained via catheter/connected to a pressurized flush line/all materials/supplies accounted for at end of procedure/location identified, draped/prepped, sterile technique used, needle inserted/introduced/sutured in place/hemostasis with direct pressure, dressing applied/ultrasound guidance
ultrasound guidance/all materials/supplies accounted for at end of procedure/location identified, draped/prepped, sterile technique used, needle inserted/introduced/connected to a pressurized flush line/positive blood return obtained via catheter/sutured in place/hemostasis with direct pressure, dressing applied

## 2018-06-28 NOTE — PROCEDURE NOTE - NSINDICATIONS_GEN_A_CORE
monitoring purposes/arterial puncture to obtain ABG's
monitoring purposes/arterial puncture to obtain ABG's
venous access/hemodynamic monitoring/volume resuscitation
feeds/meds

## 2018-06-29 LAB
BACTERIA BLD CULT: SIGNIFICANT CHANGE UP
SPECIMEN SOURCE: SIGNIFICANT CHANGE UP
SPECIMEN SOURCE: SIGNIFICANT CHANGE UP

## 2018-06-29 PROCEDURE — 74018 RADEX ABDOMEN 1 VIEW: CPT | Mod: 26

## 2018-06-30 LAB
BACTERIA SPT RESP CULT: SIGNIFICANT CHANGE UP
BACTERIA SPT RESP CULT: SIGNIFICANT CHANGE UP
BACTERIA UR CULT: SIGNIFICANT CHANGE UP
SPECIMEN SOURCE: SIGNIFICANT CHANGE UP

## 2018-07-01 LAB
BACTERIA UR CULT: SIGNIFICANT CHANGE UP
SPECIMEN SOURCE: SIGNIFICANT CHANGE UP

## 2018-07-02 LAB — BACTERIA BLD CULT: SIGNIFICANT CHANGE UP

## 2018-07-03 LAB — BACTERIA BLD CULT: SIGNIFICANT CHANGE UP

## 2018-07-30 LAB — FUNGUS SPEC QL CULT: SIGNIFICANT CHANGE UP

## 2018-08-08 LAB — ACID FAST STN SPT: SIGNIFICANT CHANGE UP

## 2024-10-08 NOTE — PATIENT PROFILE ADULT. - NS TRANSFER PATIENT BELONGINGS
45 y.o. female presents for evaluation of URI.  Symptoms including cough, congestion, body aches, malaise, and headache have been present for several days and refractory to OTC meds.  No fever, chills, nausea, vomiting, abdominal pain, CP, or SOB.  No exacerbating factors. No known COVID 19/flu exposure.      Vitals:    10/08/24 1643   BP: 129/85   Pulse: 60   Resp: 16   Temp: 36.7 °C (98 °F)   SpO2: 96%       Allergies   Allergen Reactions    Gatifloxacin Hives    Penicillins Hives       Medication Documentation Review Audit       Reviewed by Alana Lieberman MA (Medical Assistant) on 10/08/24 at 1642      Medication Order Taking? Sig Documenting Provider Last Dose Status   albuterol (Ventolin HFA) 90 mcg/actuation inhaler [id number]  Inhale 2 puffs every 4 hours if needed for wheezing or shortness of breath. Stuart Naranjo PA-C   24 2359   buPROPion XL (Wellbutrin XL) 150 mg 24 hr tablet [id number] Yes Take 1 tablet (150 mg) by mouth once daily. Roxanne Cabrera MD Taking Active   escitalopram (Lexapro) 5 mg tablet [id number] Yes TAKE 1 TABLET BY MOUTH ONCE DAILY Roxanne Cabrera MD Taking Active   fluticasone (Flonase Allergy Relief) 50 mcg/actuation nasal spray [id number] Yes Administer into affected nostril(s). Historical Provider, MD Taking Active   L. acidophilus/Bifid. animalis 32 billion cell capsule [id number] Yes Take 1 capsule by mouth once daily. Historical Provider, MD Taking Active   pantoprazole (ProtoNix) 40 mg EC tablet [id number] Yes Take 1 tablet (40 mg) by mouth once daily. Roxanne Cabrera MD Taking Active   peg 400-propylene glycol 0.4-0.3 % drops [id number] Yes Administer into affected eye(s). Historical Provider, MD Taking Active   traZODone (Desyrel) 100 mg tablet [id number] Yes Take 1 tablet (100 mg) by mouth once daily at bedtime. Roxanne Cabrera MD Taking Active                    Past Medical History:   Diagnosis Date    Allergic     Anxiety     Asthma 1992    Cataract  -  REMOVED 2023    Depression 1992    Encounter for full-term uncomplicated delivery     NVD (normal vaginal delivery)    GERD (gastroesophageal reflux disease) 2009    Inflammatory bowel disease IBS C&D 2021    Other conditions influencing health status     Menstruation    Visual impairment 2018       Past Surgical History:   Procedure Laterality Date    BACK SURGERY  02/22/1996    EYE SURGERY  12/2023 CATARACT REMOVAL/NEW LENSBOTH EYES    OTHER SURGICAL HISTORY  03/29/2022    Vertebral fusion    OTHER SURGICAL HISTORY  03/29/2022    Axillary lymph node biopsy    OTHER SURGICAL HISTORY  03/29/2022    Colonoscopy    OTHER SURGICAL HISTORY  03/29/2022    Endoscopy    OTHER SURGICAL HISTORY  07/29/2022    Tooth extraction    TUBAL LIGATION  Essure Sterilization 1/2016    WISDOM TOOTH EXTRACTION  1997       ROS  See HPI    Physical Exam  Vitals and nursing note reviewed.   Constitutional:       Appearance: She is ill-appearing (mildly).   HENT:      Head: Normocephalic and atraumatic.      Right Ear: Tympanic membrane and ear canal normal.      Left Ear: Tympanic membrane and ear canal normal.      Nose: Congestion present.      Mouth/Throat:      Mouth: Mucous membranes are moist.      Pharynx: Oropharynx is clear.   Eyes:      Extraocular Movements: Extraocular movements intact.      Conjunctiva/sclera: Conjunctivae normal.      Pupils: Pupils are equal, round, and reactive to light.   Cardiovascular:      Rate and Rhythm: Normal rate.   Pulmonary:      Effort: Pulmonary effort is normal. No respiratory distress.      Breath sounds: Normal breath sounds. No wheezing, rhonchi or rales.      Comments: Frequent dry cough  Lymphadenopathy:      Cervical: No cervical adenopathy.   Skin:     General: Skin is warm and dry.   Neurological:      General: No focal deficit present.      Mental Status: She is alert and oriented to person, place, and time.   Psychiatric:         Mood and Affect: Mood normal.         Behavior:  Behavior normal.           Assessment/Plan/MDM  Olinda was seen today for uri.  Diagnoses and all orders for this visit:  Acute bronchitis, unspecified organism (Primary)  -     azithromycin (Zithromax Z-Aris) 250 mg tablet; Take 2 tablets (500 mg) on  Day 1,  followed by 1 tablet (250 mg) once daily on Days 2 through 5.  -     predniSONE (Deltasone) 10 mg tablet; Take 6 tablets (60 mg) by mouth once daily for 1 day, THEN 5 tablets (50 mg) once daily for 1 day, THEN 4 tablets (40 mg) once daily for 1 day, THEN 3 tablets (30 mg) once daily for 1 day, THEN 2 tablets (20 mg) once daily for 1 day, THEN 1 tablet (10 mg) once daily for 1 day.  -     benzonatate (Tessalon) 200 mg capsule; Take 1 capsule (200 mg) by mouth 3 times a day as needed for cough for up to 7 days. Do not crush or chew.    Encouraged pt to continue otc cold remedies PRN, push PO fluids and rest. Patient's clinical presentation is otherwise unremarkable at this time. Patient is discharged with instructions to follow-up with primary care or seek emergency medical attention for worsening symptoms or any new concerns.    I did personally review Olinda's past medical history, surgical history, social history, as well as family history (when relevant).  In this case, I also oversaw the her drug management by reviewing her medication list, allergy list, as well as the medications that I prescribed during the UC course and/or recommended as an out-patient (including possible OTC medications such as acetaminophen, NSAIDs , etc).    After reviewing the items above, I did look at previous medical documentation, such as recent hospitalizations, office visits, and/or recent consultations with PCP/specialist.                          SDOH:   Another factor that I considered in Olinda's care was her Social Determinants of Health (SDOH). During this UC encounter, she did not have social determinants of health. Those SDOH influencing Olinda's care are:  none      Moody Barnes, CNP  [facility]  [phone]   None

## 2024-11-15 NOTE — PROGRESS NOTE ADULT - SUBJECTIVE AND OBJECTIVE BOX
INTERVAL HPI/OVERNIGHT EVENTS:    SUBJECTIVE: Patient seen and examined at bedside.     CONSTITUTIONAL: No weakness, fevers or chills  EYES/ENT: No visual changes;  No vertigo or throat pain   NECK: No pain or stiffness  RESPIRATORY: No cough, wheezing, hemoptysis; No shortness of breath  CARDIOVASCULAR: No chest pain or palpitations  GASTROINTESTINAL: No abdominal or epigastric pain. No nausea, vomiting, or hematemesis; No diarrhea or constipation. No melena or hematochezia.  GENITOURINARY: No dysuria, frequency or hematuria  NEUROLOGICAL: No numbness or weakness  SKIN: No itching, rashes    OBJECTIVE:    VITAL SIGNS:  ICU Vital Signs Last 24 Hrs  T(C): 38.9 (2018 04:00), Max: 38.9 (2018 04:00)  T(F): 102 (2018 04:00), Max: 102 (2018 04:00)  HR: 117 (2018 06:39) (91 - 126)  BP: 128/70 (2018 06:39) (52/30 - 194/86)  BP(mean): 82 (2018 06:39) (35 - 112)  ABP: --  ABP(mean): --  RR: 19 (2018 06:39) (16 - 25)  SpO2: 98% (2018 06:39) (91% - 99%)    Mode: AC/ CMV (Assist Control/ Continuous Mandatory Ventilation), RR (machine): 18, TV (machine): 450, FiO2: 50, PEEP: 12, MAP: 16, PIP: 29    06-25 @ 07:01  -  06- @ 07:00  --------------------------------------------------------  IN: 1375.1 mL / OUT: 2310 mL / NET: -934.9 mL      CAPILLARY BLOOD GLUCOSE      POCT Blood Glucose.: 211 mg/dL (2018 05:24)      PHYSICAL EXAM:    General: NAD  HEENT: NC/AT; PERRL, clear conjunctiva  Neck: supple  Respiratory: CTA b/l  Cardiovascular: +S1/S2; RRR  Abdomen: soft, NT/ND; +BS x4  Extremities: WWP, 2+ peripheral pulses b/l; no LE edema  Skin: normal color and turgor; no rash  Neurological:    MEDICATIONS:  MEDICATIONS  (STANDING):  ALBUTerol    90 MICROgram(s) HFA Inhaler 2 Puff(s) Inhalation every 6 hours  amLODIPine   Tablet 5 milliGRAM(s) Oral daily  atorvastatin 20 milliGRAM(s) Oral at bedtime  azithromycin  IVPB      azithromycin  IVPB 500 milliGRAM(s) IV Intermittent every 24 hours  chlorhexidine 0.12% Liquid 15 milliLiter(s) Swish and Spit two times a day  chlorhexidine 4% Liquid 1 Application(s) Topical <User Schedule>  cholecalciferol 1000 Unit(s) Oral daily  dextrose 5%. 1000 milliLiter(s) (50 mL/Hr) IV Continuous <Continuous>  dextrose 50% Injectable 12.5 Gram(s) IV Push once  dextrose 50% Injectable 25 Gram(s) IV Push once  dextrose 50% Injectable 25 Gram(s) IV Push once  DULoxetine 30 milliGRAM(s) Oral daily  heparin  Injectable 5000 Unit(s) SubCutaneous every 8 hours  insulin lispro (HumaLOG) corrective regimen sliding scale   SubCutaneous every 6 hours  losartan 100 milliGRAM(s) Oral daily  petrolatum Ophthalmic Ointment 1 Application(s) Both EYES every 12 hours  piperacillin/tazobactam IVPB. 3.375 Gram(s) IV Intermittent every 8 hours  vancomycin  IVPB 1500 milliGRAM(s) IV Intermittent once    MEDICATIONS  (PRN):  acetaminophen    Suspension 650 milliGRAM(s) Oral every 6 hours PRN For Temp greater than 38 C (100.4 F)  dextrose 40% Gel 15 Gram(s) Oral once PRN Blood Glucose LESS THAN 70 milliGRAM(s)/deciliter  glucagon  Injectable 1 milliGRAM(s) IntraMuscular once PRN Glucose LESS THAN 70 milligrams/deciliter      ALLERGIES:  Allergies    No Known Allergies    Intolerances        LABS:                        11.0   23.80 )-----------( 296      ( 2018 03:00 )             36.8     -    135  |  99  |  22  ----------------------------<  196<H>  3.7   |  22  |  1.40<H>    Ca    8.7      2018 03:00  Phos  2.7       Mg     2.4         TPro  7.0  /  Alb  3.4  /  TBili  0.4  /  DBili  x   /  AST  98<H>  /  ALT  34<H>  /  AlkPhos  227<H>      PT/INR - ( 2018 21:45 )   PT: 11.3 SEC;   INR: 0.98          PTT - ( 2018 21:45 )  PTT:33.0 SEC  Urinalysis Basic - ( 2018 22:15 )    Color: PLYEL / Appearance: CLEAR / S.008 / pH: 7.0  Gluc: 250 / Ketone: NEGATIVE  / Bili: NEGATIVE / Urobili: NORMAL mg/dL   Blood: SMALL / Protein: 150 mg/dL / Nitrite: NEGATIVE   Leuk Esterase: NEGATIVE / RBC: 10-25 / WBC 10-25   Sq Epi: OCC / Non Sq Epi: x / Bacteria: FEW        RADIOLOGY & ADDITIONAL TESTS: Reviewed. INTERVAL HPI/OVERNIGHT EVENTS: Patient febrile overnight and started on vancomycin. Patient hypertensive overnight and started on amlodipine and lopressor. Patient remained unresponsive following d/c sedation.     SUBJECTIVE: Patient seen and examined at bedside.     REVIEW OF SYSTEMS: Unable to obtain review of systems as patient unresponsive    OBJECTIVE:    VITAL SIGNS:  ICU Vital Signs Last 24 Hrs  T(C): 38.9 (2018 04:00), Max: 38.9 (2018 04:00)  T(F): 102 (2018 04:00), Max: 102 (2018 04:00)  HR: 117 (2018 06:39) (91 - 126)  BP: 128/70 (2018 06:39) (52/30 - 194/86)  BP(mean): 82 (2018 06:39) (35 - 112)  ABP: --  ABP(mean): --  RR: 19 (2018 06:39) (16 - 25)  SpO2: 98% (2018 06:39) (91% - 99%)    Mode: AC/ CMV (Assist Control/ Continuous Mandatory Ventilation), RR (machine): 18, TV (machine): 450, FiO2: 50, PEEP: 12, MAP: 16, PIP: 29    06-25 @ 07:01  -  06- @ 07:00  --------------------------------------------------------  IN: 1375.1 mL / OUT: 2310 mL / NET: -934.9 mL      CAPILLARY BLOOD GLUCOSE      POCT Blood Glucose.: 211 mg/dL (2018 05:24)      PHYSICAL EXAM:    General: NAD  HEENT: NC/AT; PERRL, clear conjunctiva  Neck: supple  Respiratory: CTA b/l  Cardiovascular: +S1/S2; RRR  Abdomen: soft, NT/ND; +BS x4  Extremities: WWP, 2+ peripheral pulses b/l; no LE edema  Skin: normal color and turgor; no rash  Neurological:    MEDICATIONS:  MEDICATIONS  (STANDING):  ALBUTerol    90 MICROgram(s) HFA Inhaler 2 Puff(s) Inhalation every 6 hours  amLODIPine   Tablet 5 milliGRAM(s) Oral daily  atorvastatin 20 milliGRAM(s) Oral at bedtime  azithromycin  IVPB      azithromycin  IVPB 500 milliGRAM(s) IV Intermittent every 24 hours  chlorhexidine 0.12% Liquid 15 milliLiter(s) Swish and Spit two times a day  chlorhexidine 4% Liquid 1 Application(s) Topical <User Schedule>  cholecalciferol 1000 Unit(s) Oral daily  dextrose 5%. 1000 milliLiter(s) (50 mL/Hr) IV Continuous <Continuous>  dextrose 50% Injectable 12.5 Gram(s) IV Push once  dextrose 50% Injectable 25 Gram(s) IV Push once  dextrose 50% Injectable 25 Gram(s) IV Push once  DULoxetine 30 milliGRAM(s) Oral daily  heparin  Injectable 5000 Unit(s) SubCutaneous every 8 hours  insulin lispro (HumaLOG) corrective regimen sliding scale   SubCutaneous every 6 hours  losartan 100 milliGRAM(s) Oral daily  petrolatum Ophthalmic Ointment 1 Application(s) Both EYES every 12 hours  piperacillin/tazobactam IVPB. 3.375 Gram(s) IV Intermittent every 8 hours  vancomycin  IVPB 1500 milliGRAM(s) IV Intermittent once    MEDICATIONS  (PRN):  acetaminophen    Suspension 650 milliGRAM(s) Oral every 6 hours PRN For Temp greater than 38 C (100.4 F)  dextrose 40% Gel 15 Gram(s) Oral once PRN Blood Glucose LESS THAN 70 milliGRAM(s)/deciliter  glucagon  Injectable 1 milliGRAM(s) IntraMuscular once PRN Glucose LESS THAN 70 milligrams/deciliter      ALLERGIES:  Allergies    No Known Allergies    Intolerances        LABS:                        11.0   23.80 )-----------( 296      ( 2018 03:00 )             36.8         135  |  99  |  22  ----------------------------<  196<H>  3.7   |  22  |  1.40<H>    Ca    8.7      2018 03:00  Phos  2.7       Mg     2.4         TPro  7.0  /  Alb  3.4  /  TBili  0.4  /  DBili  x   /  AST  98<H>  /  ALT  34<H>  /  AlkPhos  227<H>      PT/INR - ( 2018 21:45 )   PT: 11.3 SEC;   INR: 0.98          PTT - ( 2018 21:45 )  PTT:33.0 SEC  Urinalysis Basic - ( 2018 22:15 )    Color: PLYEL / Appearance: CLEAR / S.008 / pH: 7.0  Gluc: 250 / Ketone: NEGATIVE  / Bili: NEGATIVE / Urobili: NORMAL mg/dL   Blood: SMALL / Protein: 150 mg/dL / Nitrite: NEGATIVE   Leuk Esterase: NEGATIVE / RBC: 10-25 / WBC 10-25   Sq Epi: OCC / Non Sq Epi: x / Bacteria: FEW        RADIOLOGY & ADDITIONAL TESTS: Reviewed. [Negative] : Heme/Lymph